# Patient Record
Sex: MALE | Race: WHITE | Employment: OTHER | URBAN - METROPOLITAN AREA
[De-identification: names, ages, dates, MRNs, and addresses within clinical notes are randomized per-mention and may not be internally consistent; named-entity substitution may affect disease eponyms.]

---

## 2017-09-11 ENCOUNTER — ALLSCRIPTS OFFICE VISIT (OUTPATIENT)
Dept: OTHER | Facility: OTHER | Age: 72
End: 2017-09-11

## 2017-09-11 LAB
BILIRUB UR QL STRIP: NORMAL
CLARITY UR: NORMAL
COLOR UR: YELLOW
GLUCOSE (HISTORICAL): NORMAL
HGB UR QL STRIP.AUTO: NORMAL
KETONES UR STRIP-MCNC: NORMAL MG/DL
LEUKOCYTE ESTERASE UR QL STRIP: NORMAL
NITRITE UR QL STRIP: NORMAL
PH UR STRIP.AUTO: 7.5 [PH]
PROT UR STRIP-MCNC: NORMAL MG/DL
SP GR UR STRIP.AUTO: 1.01
UROBILINOGEN UR QL STRIP.AUTO: 0.2

## 2017-11-20 ENCOUNTER — GENERIC CONVERSION - ENCOUNTER (OUTPATIENT)
Dept: OTHER | Facility: OTHER | Age: 72
End: 2017-11-20

## 2018-01-13 VITALS
WEIGHT: 180 LBS | DIASTOLIC BLOOD PRESSURE: 80 MMHG | SYSTOLIC BLOOD PRESSURE: 130 MMHG | BODY MASS INDEX: 26.66 KG/M2 | HEIGHT: 69 IN

## 2018-01-22 VITALS
RESPIRATION RATE: 18 BRPM | SYSTOLIC BLOOD PRESSURE: 124 MMHG | DIASTOLIC BLOOD PRESSURE: 72 MMHG | HEART RATE: 62 BPM | TEMPERATURE: 97.7 F | OXYGEN SATURATION: 97 % | HEIGHT: 70 IN | BODY MASS INDEX: 25.2 KG/M2 | WEIGHT: 176 LBS

## 2018-03-15 ENCOUNTER — OFFICE VISIT (OUTPATIENT)
Dept: UROLOGY | Facility: MEDICAL CENTER | Age: 73
End: 2018-03-15
Payer: MEDICARE

## 2018-03-15 VITALS — HEIGHT: 69 IN | SYSTOLIC BLOOD PRESSURE: 128 MMHG | DIASTOLIC BLOOD PRESSURE: 80 MMHG

## 2018-03-15 DIAGNOSIS — N52.03 COMBINED ARTERIAL INSUFFICIENCY AND CORPORO-VENOUS OCCLUSIVE ERECTILE DYSFUNCTION: ICD-10-CM

## 2018-03-15 DIAGNOSIS — N40.0 BPH WITHOUT URINARY OBSTRUCTION: Primary | ICD-10-CM

## 2018-03-15 LAB
SL AMB  POCT GLUCOSE, UA: ABNORMAL
SL AMB LEUKOCYTE ESTERASE,UA: ABNORMAL
SL AMB POCT BILIRUBIN,UA: ABNORMAL
SL AMB POCT BLOOD,UA: ABNORMAL
SL AMB POCT CLARITY,UA: CLEAR
SL AMB POCT COLOR,UA: YELLOW
SL AMB POCT KETONES,UA: ABNORMAL
SL AMB POCT NITRITE,UA: ABNORMAL
SL AMB POCT PH,UA: 6
SL AMB POCT SPECIFIC GRAVITY,UA: 1.01
SL AMB POCT URINE PROTEIN: ABNORMAL
SL AMB POCT UROBILINOGEN: 0.2

## 2018-03-15 PROCEDURE — 99215 OFFICE O/P EST HI 40 MIN: CPT | Performed by: UROLOGY

## 2018-03-15 PROCEDURE — 81003 URINALYSIS AUTO W/O SCOPE: CPT | Performed by: UROLOGY

## 2018-03-15 RX ORDER — AMLODIPINE BESYLATE 5 MG/1
1 TABLET ORAL DAILY
COMMUNITY
End: 2018-03-15

## 2018-03-15 RX ORDER — ATORVASTATIN CALCIUM 10 MG/1
10 TABLET, FILM COATED ORAL DAILY
Refills: 3 | COMMUNITY
Start: 2018-03-05

## 2018-03-15 RX ORDER — FLUOXETINE HYDROCHLORIDE 20 MG/1
20 CAPSULE ORAL EVERY MORNING
Refills: 3 | COMMUNITY
Start: 2018-02-09 | End: 2018-03-15

## 2018-03-15 RX ORDER — ZOLPIDEM TARTRATE 10 MG/1
5 TABLET ORAL DAILY
Refills: 5 | COMMUNITY
Start: 2018-02-05

## 2018-03-15 RX ORDER — ATORVASTATIN CALCIUM 10 MG/1
1 TABLET, FILM COATED ORAL DAILY
COMMUNITY
End: 2018-03-15 | Stop reason: SDUPTHER

## 2018-03-15 RX ORDER — LOSARTAN POTASSIUM 100 MG/1
100 TABLET ORAL DAILY
Refills: 3 | COMMUNITY
Start: 2018-02-22

## 2018-03-15 RX ORDER — SUMATRIPTAN 25 MG/1
TABLET, FILM COATED ORAL
Refills: 5 | COMMUNITY
Start: 2018-02-19

## 2018-03-15 RX ORDER — FLUOXETINE HYDROCHLORIDE 40 MG/1
1 CAPSULE ORAL DAILY
COMMUNITY
End: 2018-03-15 | Stop reason: SDUPTHER

## 2018-03-15 NOTE — PROGRESS NOTES
Assessment/Plan:  1  Erectile dysfunction secondary to arterial and veno-occlusive dysfunction  The patient has hypertension and is on a statin and more than likely the vessel disease is the cause of his ED  The patient no longer responds adequately to injection therapy is not interested in vacuum constriction devices  The patient is interested in pursuing an IPP and this was discussed in detail with the patient being made aware of the potential of bleeding infection deformity loss of size pain need for explantation or revision and the possibility of mechanical failure  The patient will consider penile implantation and return for scheduling if he wishes to do so  2   BPH without urinary obstructive symptoms  The patient is voiding adequately and has no complaints  No problem-specific Assessment & Plan notes found for this encounter  Diagnoses and all orders for this visit:    BPH without urinary obstruction  -     POCT urine dip auto non-scope    Combined arterial insufficiency and corporo-venous occlusive erectile dysfunction    Other orders  -     Discontinue: amLODIPine (NORVASC) 5 mg tablet; Take 1 tablet by mouth daily  -     atorvastatin (LIPITOR) 10 mg tablet; Take 10 mg by mouth daily  -     Discontinue: FLUoxetine (PROzac) 20 mg capsule; Take 20 mg by mouth every morning  -     Discontinue: atorvastatin (LIPITOR) 10 mg tablet; Take 1 tablet by mouth daily  -     losartan (COZAAR) 100 MG tablet; Take 100 mg by mouth daily  -     Discontinue: FLUoxetine (PROZAC) 40 MG capsule; Take 1 capsule by mouth daily  -     SUMAtriptan (IMITREX) 25 mg tablet; TAKE 1 TABLET BY MOUTH AT ONSET OF MIGRAINE  MAY REPEAT ONE TIME WITHIN 24 HOURS  -     zolpidem (AMBIEN) 10 mg tablet; Take 10 mg by mouth daily          Subjective:      Patient ID: Woody Rainey is a 81 y o  male      HPI 70-year-old male with significant erectile dysfunction who no longer responds the PDE 5 inhibitors and now notes that quad Mix no longer produces an adequately rigid erection  He also suffers from early de tumescence  He presents to discuss further potential therapies  The following portions of the patient's history were reviewed and updated as appropriate: allergies, current medications, past family history, past medical history, past social history, past surgical history and problem list     Review of Systems   Constitutional: Negative  HENT:        Recent loss of hearing on a sensory neuro basis in the left ear   Eyes: Negative  Respiratory: Negative  Cardiovascular: Negative  Gastrointestinal: Negative  Endocrine: Negative  Genitourinary: Negative  Musculoskeletal: Negative  Neurological: Negative  Psychiatric/Behavioral: Negative  Objective:      /80   Ht 5' 9" (1 753 m)          Physical Exam   Constitutional: He is oriented to person, place, and time  He appears well-developed and well-nourished  No distress  HENT:   Head: Atraumatic  Eyes: Conjunctivae and EOM are normal    Pulmonary/Chest: Breath sounds normal  No respiratory distress  Abdominal: Soft  He exhibits no distension  Genitourinary:   Genitourinary Comments: Penis normal circumcised without cutaneous lesions  Meatus patent and normally placed  Scrotum without cutaneous lesions  Testes and adnexa completely normal without palpable masses or tenderness  Neurological: He is alert and oriented to person, place, and time  Psychiatric: He has a normal mood and affect  His behavior is normal  Judgment and thought content normal    Nursing note and vitals reviewed

## 2018-03-15 NOTE — PROGRESS NOTES
IPSS Questionnaire (AUA-7): Over the past month    1)  How often have you had a sensation of not emptying your bladder completely after you finish urinating? 1 - Less than 1 time in 5   2)  How often have you had to urinate again less than two hours after you finished urinating? 0 - Not at all   3)  How often have you found you stopped and started again several times when you urinated? 2 - Less than half the time   4) How difficult have you found it to postpone urination? 0 - Not at all   5) How often have you had a weak urinary stream?  1 - Less than 1 time in 5   6) How often have you had to push or strain to begin urination? 0 - Not at all   7) How many times did you most typically get up to urinate from the time you went to bed until the time you got up in the morning?   1 - 1 time   Total Score:  5

## 2018-03-15 NOTE — LETTER
March 15, 2018     John Bui MD  [address]    Patient: Madelaine Acevedo   YOB: 1945   Date of Visit: 3/15/2018       Dear Dr Jon: Thank you for referring Orville Hdz to me for evaluation  Below are my notes for this consultation  If you have questions, please do not hesitate to call me  I look forward to following your patient along with you  Sincerely,        Krystin Maldonado MD        CC: No Recipients  Krystin Maldonado MD  3/15/2018 11:34 AM  Sign at close encounter  Assessment/Plan:  1  Erectile dysfunction secondary to arterial and veno-occlusive dysfunction  The patient has hypertension and is on a statin and more than likely the vessel disease is the cause of his ED  The patient no longer responds adequately to injection therapy is not interested in vacuum constriction devices  The patient is interested in pursuing an IPP and this was discussed in detail with the patient being made aware of the potential of bleeding infection deformity loss of size pain need for explantation or revision and the possibility of mechanical failure  The patient will consider penile implantation and return for scheduling if he wishes to do so  2   BPH without urinary obstructive symptoms  The patient is voiding adequately and has no complaints  No problem-specific Assessment & Plan notes found for this encounter  Diagnoses and all orders for this visit:    BPH without urinary obstruction  -     POCT urine dip auto non-scope    Combined arterial insufficiency and corporo-venous occlusive erectile dysfunction    Other orders  -     Discontinue: amLODIPine (NORVASC) 5 mg tablet; Take 1 tablet by mouth daily  -     atorvastatin (LIPITOR) 10 mg tablet; Take 10 mg by mouth daily  -     Discontinue: FLUoxetine (PROzac) 20 mg capsule; Take 20 mg by mouth every morning  -     Discontinue: atorvastatin (LIPITOR) 10 mg tablet;  Take 1 tablet by mouth daily  -     losartan (COZAAR) 100 MG tablet; Take 100 mg by mouth daily  -     Discontinue: FLUoxetine (PROZAC) 40 MG capsule; Take 1 capsule by mouth daily  -     SUMAtriptan (IMITREX) 25 mg tablet; TAKE 1 TABLET BY MOUTH AT ONSET OF MIGRAINE  MAY REPEAT ONE TIME WITHIN 24 HOURS  -     zolpidem (AMBIEN) 10 mg tablet; Take 10 mg by mouth daily          Subjective:      Patient ID: Drew Francis is a 81 y o  male  HPI 74-year-old male with significant erectile dysfunction who no longer responds the PDE 5 inhibitors and now notes that quad Mix no longer produces an adequately rigid erection  He also suffers from early de tumescence  He presents to discuss further potential therapies  The following portions of the patient's history were reviewed and updated as appropriate: allergies, current medications, past family history, past medical history, past social history, past surgical history and problem list     Review of Systems   Constitutional: Negative  HENT:        Recent loss of hearing on a sensory neuro basis in the left ear   Eyes: Negative  Respiratory: Negative  Cardiovascular: Negative  Gastrointestinal: Negative  Endocrine: Negative  Genitourinary: Negative  Musculoskeletal: Negative  Neurological: Negative  Psychiatric/Behavioral: Negative  Objective:      /80   Ht 5' 9" (1 753 m)          Physical Exam   Constitutional: He is oriented to person, place, and time  He appears well-developed and well-nourished  No distress  HENT:   Head: Atraumatic  Eyes: Conjunctivae and EOM are normal    Pulmonary/Chest: Breath sounds normal  No respiratory distress  Abdominal: Soft  He exhibits no distension  Genitourinary:   Genitourinary Comments: Penis normal circumcised without cutaneous lesions  Meatus patent and normally placed  Scrotum without cutaneous lesions  Testes and adnexa completely normal without palpable masses or tenderness  Neurological: He is alert and oriented to person, place, and time  Psychiatric: He has a normal mood and affect  His behavior is normal  Judgment and thought content normal    Nursing note and vitals reviewed

## 2018-09-11 DIAGNOSIS — N40.0 ENLARGED PROSTATE WITHOUT LOWER URINARY TRACT SYMPTOMS (LUTS): ICD-10-CM

## 2018-11-19 NOTE — PROGRESS NOTES
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 with Dr Jarad Comer who is currently ROSA, on yearly f/u  He sees his dermatologist yearly as well  He has not had a CXR this interval and does not want to have one  He was also due for lab work, which he did not want done either  He has no evidence of recurrent disease  He will continue to follow up with us once a year  Subjective:   CC: follow up regarding lentigo melanoma      HPI:    Mr  Irineo Matthews is a 70-year-old white male, who returns to the office today after a lapse of about 5 years  He was originally seen here at our office, January 27, 2011  At that time, he had come to us after a diagnosis of Lente go melanoma in situ of the left maxillary cheek  He is status post microscopic surgery 12, 2010 by [facility] [place]  He was followed by Dr Marry Munoz  for multiple dysplastic nevi  At that time  He does have a strong family history of melanoma as his brothers did die from this disease  He comes back today for a checkup  Interval History:    Overall he is doing well  He has a good energy level with an ECOG performance status of zero  He has a good appetite and his weight has been stable  He does have tinnitus in his one ear since a cold he had a year ago  He otherwise denies fevers ,chills, CP, SOB, N/V, diarrhea, all other ROS are unremarkable  PFSh was reviewed  Cancer Staging:  lentigo melanoma in situ of cheek    BRAF: not tested    Previous Hematologic/ Oncologic History:    Excision left maxillary cheek, Mohs procedure     Current Hematologic/ Oncologic Treatment:    observation          Test Results:    Imaging: No results found      Labs: No results found for: WBC, HGB, HCT, MCV, PLT  No results found for: NA, K, CL, CO2, ANIONGAP, BUN, CREATININE, GLUCOSE, GLUF, CALCIUM, CORRECTEDCA, AST, ALT, ALKPHOS, PROT, BILITOT, EGFR      No results found for: SPEP, UPEP    No results found for: PSA    No results found for: CEA    No results found for:     No results found for: AFP    No results found for: IRON, TIBC, FERRITIN    No results found for: [id number]      ROS: Review of Systems   Constitutional: Negative  HENT: Positive for tinnitus  Eyes: Negative  Respiratory: Negative  Cardiovascular: Negative  Gastrointestinal: Negative  Endocrine: Negative  Genitourinary: Negative  Musculoskeletal: Negative  Skin: Negative  Allergic/Immunologic: Negative  Neurological: Negative  Hematological: Negative  Psychiatric/Behavioral: Negative  Current Medications: Reviewed  Allergies: Reviewed  PMH/FH/SH:  Reviewed      Physical Exam:    There is no height or weight on file to calculate BSA  Wt Readings from Last 3 Encounters:   11/20/17 79 8 kg (176 lb)   09/11/17 81 6 kg (180 lb)   11/10/16 84 8 kg (187 lb)        Temp Readings from Last 3 Encounters:   11/20/17 97 7 °F (36 5 °C)   11/10/16 97 8 °F (36 6 °C)   11/10/15 (!) 96 8 °F (36 °C)        BP Readings from Last 3 Encounters:   03/15/18 128/80   11/20/17 124/72   09/11/17 130/80         Pulse Readings from Last 3 Encounters:   11/20/17 62   11/10/16 74   11/10/15 71     @LASTSAO2(3)@      Physical Exam   Constitutional: He is oriented to person, place, and time  He appears well-developed and well-nourished  HENT:   Head: Normocephalic and atraumatic  Mouth/Throat: Oropharynx is clear and moist    Eyes: Pupils are equal, round, and reactive to light  Conjunctivae and EOM are normal    Neck: Normal range of motion  Neck supple  No thyromegaly present  Cardiovascular: Normal rate, regular rhythm and normal heart sounds  Pulmonary/Chest: Effort normal and breath sounds normal    Abdominal: Soft  Bowel sounds are normal  He exhibits no distension and no mass   There is no tenderness  Musculoskeletal: Normal range of motion  He exhibits no edema  Lymphadenopathy:     He has no cervical adenopathy  Neurological: He is alert and oriented to person, place, and time  He has normal reflexes  Skin: Skin is warm and dry  No erythema  Psychiatric: He has a normal mood and affect  Vitals reviewed  Goals and Barriers:  Current Goal: Curative intent  Barriers: None  Patient's Capacity to Self Care:  Patient fully able to self care      Code Status: [unfilled]  Advance Directive and Living Will:      Power of :

## 2018-11-20 ENCOUNTER — OFFICE VISIT (OUTPATIENT)
Dept: HEMATOLOGY ONCOLOGY | Facility: CLINIC | Age: 73
End: 2018-11-20
Payer: MEDICARE

## 2018-11-20 VITALS
SYSTOLIC BLOOD PRESSURE: 118 MMHG | HEART RATE: 73 BPM | RESPIRATION RATE: 14 BRPM | BODY MASS INDEX: 27.85 KG/M2 | HEIGHT: 69 IN | WEIGHT: 188 LBS | DIASTOLIC BLOOD PRESSURE: 72 MMHG | TEMPERATURE: 98.5 F

## 2018-11-20 DIAGNOSIS — N40.0 ENLARGED PROSTATE WITHOUT LOWER URINARY TRACT SYMPTOMS (LUTS): ICD-10-CM

## 2018-11-20 DIAGNOSIS — C43.30 MALIGNANT MELANOMA OF FACE EXCLUDING EYELID, NOSE, LIP, AND EAR (HCC): Primary | ICD-10-CM

## 2018-11-20 DIAGNOSIS — Z00.00 ENCOUNTER FOR GENERAL ADULT MEDICAL EXAMINATION WITHOUT ABNORMAL FINDINGS: ICD-10-CM

## 2018-11-20 DIAGNOSIS — C43.9 MALIGNANT MELANOMA OF SKIN (HCC): ICD-10-CM

## 2018-11-20 PROCEDURE — 99213 OFFICE O/P EST LOW 20 MIN: CPT | Performed by: SPECIALIST

## 2018-11-20 NOTE — LETTER
November 20, 2018     Kat Curry MD  [address] [place] [address]    Patient: Finn Rees   YOB: 1945   Date of Visit: 11/20/2018       Dear Dr Colt Pittman: Thank you for referring Renita Jatinder to me for evaluation  Below are my notes for this consultation  If you have questions, please do not hesitate to call me  I look forward to following your patient along with you  Sincerely,        Levonia Landau, MD        CC: No Recipients  Levonia Landau, MD  11/20/2018  1:38 PM  Sign at close encounter  Hematology/Oncology Outpatient Follow- up Note  Finn Rees 72 y o  male MRN: @ Encounter: [id number]        Date:  11/19/2018        Assessment / Plan:    1  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 with Dr Alfredo Urena who is currently ROSA, on yearly f/u  He sees his dermatologist yearly as well  He has not had a CXR this interval and does not want to have one  He was also due for lab work, which he did not want done either  He has no evidence of recurrent disease  He will continue to follow up with us once a year  Subjective:   CC: follow up regarding lentigo melanoma      HPI:    Mr Carlitos Ramsey is a 70-year-old white male, who returns to the office today after a lapse of about 5 years  He was originally seen here at our office, January 27, 2011  At that time, he had come to us after a diagnosis of Lente go melanoma in situ of the left maxillary cheek  He is status post microscopic surgery 12, 2010 by [facility] [place]  He was followed by Dr Sherin Keys  for multiple dysplastic nevi  At that time  He does have a strong family history of melanoma as his brothers did die from this disease  He comes back today for a checkup  Interval History:    Overall he is doing well  He has a good energy level with an ECOG performance status of zero  He has a good appetite and his weight has been stable   He does have tinnitus in his one ear since a cold he had a year ago  He otherwise denies fevers ,chills, CP, SOB, N/V, diarrhea, all other ROS are unremarkable  PFSh was reviewed  Cancer Staging:  lentigo melanoma in situ of cheek    BRAF: not tested    Previous Hematologic/ Oncologic History:    Excision left maxillary cheek, Mohs procedure     Current Hematologic/ Oncologic Treatment:    observation          Test Results:    Imaging: No results found  Labs: No results found for: WBC, HGB, HCT, MCV, PLT  No results found for: NA, K, CL, CO2, ANIONGAP, BUN, CREATININE, GLUCOSE, GLUF, CALCIUM, CORRECTEDCA, AST, ALT, ALKPHOS, PROT, BILITOT, EGFR      No results found for: SPEP, UPEP    No results found for: PSA    No results found for: CEA    No results found for:     No results found for: AFP    No results found for: IRON, TIBC, FERRITIN    No results found for: [id number]      ROS: Review of Systems   Constitutional: Negative  HENT: Positive for tinnitus  Eyes: Negative  Respiratory: Negative  Cardiovascular: Negative  Gastrointestinal: Negative  Endocrine: Negative  Genitourinary: Negative  Musculoskeletal: Negative  Skin: Negative  Allergic/Immunologic: Negative  Neurological: Negative  Hematological: Negative  Psychiatric/Behavioral: Negative  Current Medications: Reviewed  Allergies: Reviewed  PMH/FH/SH:  Reviewed      Physical Exam:    There is no height or weight on file to calculate BSA      Wt Readings from Last 3 Encounters:   11/20/17 79 8 kg (176 lb)   09/11/17 81 6 kg (180 lb)   11/10/16 84 8 kg (187 lb)        Temp Readings from Last 3 Encounters:   11/20/17 97 7 °F (36 5 °C)   11/10/16 97 8 °F (36 6 °C)   11/10/15 (!) 96 8 °F (36 °C)        BP Readings from Last 3 Encounters:   03/15/18 128/80   11/20/17 124/72   09/11/17 130/80         Pulse Readings from Last 3 Encounters:   11/20/17 62   11/10/16 74   11/10/15 71     @LASTSAO2(3)@      Physical Exam   Constitutional: He is oriented to person, place, and time  He appears well-developed and well-nourished  HENT:   Head: Normocephalic and atraumatic  Mouth/Throat: Oropharynx is clear and moist    Eyes: Pupils are equal, round, and reactive to light  Conjunctivae and EOM are normal    Neck: Normal range of motion  Neck supple  No thyromegaly present  Cardiovascular: Normal rate, regular rhythm and normal heart sounds  Pulmonary/Chest: Effort normal and breath sounds normal    Abdominal: Soft  Bowel sounds are normal  He exhibits no distension and no mass  There is no tenderness  Musculoskeletal: Normal range of motion  He exhibits no edema  Lymphadenopathy:     He has no cervical adenopathy  Neurological: He is alert and oriented to person, place, and time  He has normal reflexes  Skin: Skin is warm and dry  No erythema  Psychiatric: He has a normal mood and affect  Vitals reviewed  Goals and Barriers:  Current Goal: Curative intent  Barriers: None  Patient's Capacity to Self Care:  Patient fully able to self care      Code Status: [unfilled]  Advance Directive and Living Will:      Power of :

## 2019-09-26 ENCOUNTER — TELEPHONE (OUTPATIENT)
Dept: UROLOGY | Facility: MEDICAL CENTER | Age: 74
End: 2019-09-26

## 2019-09-26 NOTE — TELEPHONE ENCOUNTER
Tell patient we need a copy of the report and he should bring a CD disc of the images with him to his next office visit

## 2019-09-26 NOTE — TELEPHONE ENCOUNTER
FYI    Patient of Dr Jessi Gandhi seen in the [facility] office  Patient called to advised that he had a MRI and they found a renal lesion in the left kidney  Patient just wanted to make Dr Jessi Gandhi and Dr Belem Basilio know  Please advise

## 2021-06-10 ENCOUNTER — TELEPHONE (OUTPATIENT)
Dept: HEMATOLOGY ONCOLOGY | Facility: CLINIC | Age: 76
End: 2021-06-10

## 2021-06-10 NOTE — TELEPHONE ENCOUNTER
Appointment Confirmation     Appointment with  [address]   Appointment date & time  6-17-21 @ 2:20pm   Location [facility]    Patient verbilized Understanding

## 2021-06-17 ENCOUNTER — OFFICE VISIT (OUTPATIENT)
Dept: HEMATOLOGY ONCOLOGY | Facility: CLINIC | Age: 76
End: 2021-06-17
Payer: MEDICARE

## 2021-06-17 VITALS
HEIGHT: 69 IN | TEMPERATURE: 98.4 F | BODY MASS INDEX: 26.81 KG/M2 | HEART RATE: 69 BPM | OXYGEN SATURATION: 98 % | SYSTOLIC BLOOD PRESSURE: 122 MMHG | RESPIRATION RATE: 16 BRPM | DIASTOLIC BLOOD PRESSURE: 72 MMHG | WEIGHT: 181 LBS

## 2021-06-17 DIAGNOSIS — C83.00 LYMPHOMA, SMALL LYMPHOCYTIC (HCC): Primary | ICD-10-CM

## 2021-06-17 DIAGNOSIS — Z80.9 FAMILY HISTORY OF CANCER: ICD-10-CM

## 2021-06-17 PROCEDURE — 99214 OFFICE O/P EST MOD 30 MIN: CPT | Performed by: INTERNAL MEDICINE

## 2021-06-17 RX ORDER — AMLODIPINE BESYLATE 5 MG/1
5 TABLET ORAL
COMMUNITY

## 2021-06-17 RX ORDER — METOPROLOL SUCCINATE 25 MG/1
25 TABLET, EXTENDED RELEASE ORAL DAILY
COMMUNITY
Start: 2021-05-17

## 2021-06-17 RX ORDER — IRBESARTAN 150 MG/1
TABLET ORAL
COMMUNITY

## 2021-06-17 RX ORDER — FLUOXETINE HYDROCHLORIDE 40 MG/1
CAPSULE ORAL
COMMUNITY

## 2021-06-17 NOTE — LETTER
June 23, 2021     Silvia Ricardo MD  [address]    Patient: Leif Pool   YOB: 1945   Date of Visit: 6/17/2021       Dear Dr Sherryle Borne: Thank you for referring Alma Li to me for evaluation  Below are my notes for this consultation  If you have questions, please do not hesitate to call me  I look forward to following your patient along with you  Sincerely,        Jaja Parker MD        CC: MD Jaja Pompa MD  6/23/2021  5:06 AM  Sign when Signing Visit  [address] [place] [address] 3  [address] [place] [address] Yasmani Goldsmith 1159  [phone]  [phone]     Date of Visit: 6/17/2021  Name: Leif Pool   YOB: 1945     Subjective    Subjective    VISIT DIAGNOSIS:  Diagnoses and all orders for this visit:    Lymphoma, small lymphocytic ([facility] [address] )    Family history of cancer    Other orders  -     FLUoxetine (PROzac) 40 MG capsule; Prozac  -     amLODIPine (NORVASC) 5 mg tablet; 5 mg  -     irbesartan (AVAPRO) 150 mg tablet; irbesartan 150 mg tablet  -     metoprolol succinate (TOPROL-XL) 25 mg 24 hr tablet;  Take 25 mg by mouth daily        Oncology History   Malignant melanoma of face excluding eyelid, nose, lip, and ear ([facility] [address] )   8/12/2010 -  Cancer Staged    Staging form: Melanoma of the Skin, AJCC 6th Edition  - Clinical stage from 8/12/2010: Stage 0 (Tis, N0, M0) - Signed by Jaja Parker MD on 6/23/2021 8/12/2010 Surgery    Moh's surgery with Dr Felisha Aguirre at [facility]     11/20/2018 Initial Diagnosis    Malignant melanoma of face excluding eyelid, nose, lip, and ear ([address] )        Cancer Staging  Malignant melanoma of face excluding eyelid, nose, lip, and ear ([address] )  Staging form: Melanoma of the Skin, AJCC 6th Edition  - Clinical stage from 8/12/2010: Stage 0 (Tis, N0, M0) - Signed by Jaja Parker MD on 6/23/2021     [No treatment plan]     HISTORY OF PRESENT ILLNESS: Satnam Abbasi is a 78 y o  male  who has history of MIS (Stage 0) melanoma diagnosed in August 2010  Biopsy demonstrate lenttigo maligna melanoma Stage 0 (MIS)>  He underwent Moh's surgery with Cathy Gil  He had remained ROSA since  He has had intermittent follow up with medical oncology, Dr Edu Fiore in 11/2018, and dermatology, last approximately 1 1/2 years ago  He is doing well and feels good  He denies any new, changing, or concerning lesions  REVIEW OF SYSTEMS:  Review of Systems   Constitutional: Negative for appetite change, fatigue, fever and unexpected weight change  HENT:   Negative for lump/mass  Eyes: Negative for icterus  Respiratory: Negative for cough, shortness of breath and wheezing  Cardiovascular: Negative for leg swelling  Gastrointestinal: Negative for abdominal pain, constipation, diarrhea, nausea and vomiting  Genitourinary: Negative for difficulty urinating and hematuria  Musculoskeletal: Negative for arthralgias, gait problem and myalgias  Skin: Negative for itching and rash  No new, changing, or concerning lesions  Neurological: Negative for extremity weakness, gait problem, headaches, light-headedness and numbness  Hematological: Negative for adenopathy          MEDICATIONS:    Current Outpatient Medications:     amLODIPine (NORVASC) 5 mg tablet, 5 mg, Disp: , Rfl:     atorvastatin (LIPITOR) 10 mg tablet, Take 10 mg by mouth daily, Disp: , Rfl: 3    FLUoxetine (PROzac) 40 MG capsule, Prozac, Disp: , Rfl:     irbesartan (AVAPRO) 150 mg tablet, irbesartan 150 mg tablet, Disp: , Rfl:     losartan (COZAAR) 100 MG tablet, Take 100 mg by mouth daily, Disp: , Rfl: 3    metoprolol succinate (TOPROL-XL) 25 mg 24 hr tablet, Take 25 mg by mouth daily, Disp: , Rfl:     SUMAtriptan (IMITREX) 25 mg tablet, TAKE 1 TABLET BY MOUTH AT ONSET OF MIGRAINE  MAY REPEAT ONE TIME WITHIN 24 HOURS, Disp: , Rfl: 5    zolpidem (AMBIEN) 10 mg tablet, Take 5 mg by mouth daily , Disp: , Rfl: 5     ALLERGIES:  Allergies   Allergen Reactions    Percocet  [Oxycodone-Acetaminophen] Rash        ACTIVE PROBLEMS:  Patient Active Problem List   Diagnosis    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    Malignant melanoma of face excluding eyelid, nose, lip, and ear [facility]    Essential hypertension    Lymphoma, small lymphocytic ([facility]    Family history of cancer          PAST MEDICAL HISTORY:   Past Medical History:   Diagnosis Date    BPH with obstruction/lower urinary tract symptoms 2016    [facility]LL [facility]ocytic [facility]) [facility]     Corporo-venous occlusive erectile dysfunction 2016    Cyst, kidney, acquired 2016    Hearing loss     asymmetric left sided    Hyperlipidemia     Hypertension     Skin cancer     melanoma        PAST SURGICAL HISTORY:  Past Surgical History:   Procedure Laterality Date    BACK SURGERY  2016    EAR SURGERY Left     transtympanic steroid injections x 3 - 2017    SKIN CANCER EXCISION      facial melanoma - [facility]        SOCIAL HISTORY:  Social History     Socioeconomic History    Marital status: /Civil Union     Spouse name: None    Number of children: None    Years of education: None    Highest education level: None   Occupational History    None   Tobacco Use    Smoking status: Former Smoker     Packs/day: 1 00     Types: Cigarettes     Start date: 1967     Quit date:      Years since quittin 4    Smokeless tobacco: Never Used   Vaping Use    Vaping Use: Never used   Substance and Sexual Activity    Alcohol use:  Yes     Alcohol/week: 14 0 standard drinks     Types: 14 Glasses of wine per week    Drug use: Yes     Types: Marijuana    Sexual activity: None   Other Topics Concern    None   Social History Narrative    Consumes 3-4 cups of coffee per day     Social Determinants of Health     Financial Resource Strain:     Difficulty of Paying Living Expenses:    Food Insecurity:     Worried About Running Out of Food in the Last Year:     [address] in the Last Year:    Transportation Needs:     Lack of Transportation (Medical):  Lack of Transportation (Non-Medical):    Physical Activity:     Days of Exercise per Week:     Minutes of Exercise per Session:    Stress:     Feeling of Stress :    Social Connections:     Frequency of Communication with Friends and Family:     Frequency of Social Gatherings with Friends and Family:     Attends Mandaen Services:     Active Member of Clubs or Organizations:     Attends Club or Organization Meetings:     Marital Status:    Intimate Partner Violence:     Fear of Current or Ex-Partner:     Emotionally Abused:     Physically Abused:     Sexually Abused:         FAMILY HISTORY:  Family History   Problem Relation Age of Onset    Pancreatic cancer Father     Skin cancer Sister     Breast cancer Sister     Melanoma Brother         Objective    Objective    PHYSICAL EXAMINATION:   Blood pressure 122/72, pulse 69, temperature 98 4 °F (36 9 °C), temperature source Temporal, resp  rate 16, height 5' 9" (1 753 m), weight 82 1 kg (181 lb), SpO2 98 %  ECOG Performance Status      Most Recent Value   ECOG Performance Status  0 - Fully active, able to carry on all pre-disease performance without restriction            Physical Exam  Constitutional:       General: He is not in acute distress  Appearance: Normal appearance  He is not toxic-appearing  HENT:      Mouth/Throat:      Mouth: Mucous membranes are moist       Pharynx: Oropharynx is clear  Eyes:      General: No scleral icterus  Cardiovascular:      Rate and Rhythm: Normal rate and regular rhythm  Pulses: Normal pulses  Heart sounds: No murmur heard  No friction rub  No gallop  Pulmonary:      Effort: Pulmonary effort is normal  No respiratory distress  Breath sounds: Normal breath sounds  No wheezing or rales     Abdominal: General: There is no distension  Palpations: There is no mass  Tenderness: There is no abdominal tenderness  There is no rebound  Musculoskeletal:         General: No swelling or tenderness  Right lower leg: No edema  Left lower leg: No edema  Lymphadenopathy:      Head:      Right side of head: No submandibular, preauricular or posterior auricular adenopathy  Left side of head: No submandibular, preauricular or posterior auricular adenopathy  Cervical: No cervical adenopathy  Right cervical: No superficial or posterior cervical adenopathy  Left cervical: No superficial or posterior cervical adenopathy  Upper Body:      Right upper body: No supraclavicular or axillary adenopathy  Left upper body: No supraclavicular or axillary adenopathy  Lower Body: No right inguinal adenopathy  No left inguinal adenopathy  Skin:     Findings: No rash  Comments: Well healed surgical scar  No evidence of recurrence at primary site  Neurological:      General: No focal deficit present  Mental Status: He is alert and oriented to person, place, and time  Psychiatric:         Mood and Affect: Mood normal          Behavior: Behavior normal          Thought Content: Thought content normal          Judgment: Judgment normal          I reviewed lab data in the chart  Labs in Care Everywhere reviewed  No results found for: WBC, HGB, PLT, MCV   No results found for: NA, K, CL, CO2, BUN, CREATININE, GLUC, EGFRNAA, EGFRAA, CALCIUM, PROT, ALB, TBILI, ALKPHOS, AST, ALT   No results found for: LDH  No results found for: TSH  No results found for: [id number]   No results found for: FREET4      RECENT IMAGING:  No results found  Assessment    Assessment/Plan    Mr Sugar Nolasco is a 78 yr male with MIS (Stage 0) melanoma here for follow up and surveillance      Malignant melanoma of face excluding eyelid, nose, lip, and ear [facility])  Mr Sugar Nolasco is a 63-year-old man with melanoma in-situ diagnosed in August 2010  Since then, he has been under surveillance intermittently with medical oncology and dermatology  Current Mckenzie, he has not seen Dermatology in approximately over a year, and I recommended that he continue to see Dermatology with close follow-up  He had been a patient of Dr Elena Soto and last saw him in November 2018  He is currently doing well and denies any new, changing, or concerning lesions  We had asked him to get labs following today's visit, but he states that he had labs recently performed by his primary care doctor,  and did not wish to repeat these labs  We do note that his labs do not contain an LDH  We discussed safe sun practices including hat, sun protective clothing, sunscreen use, and avoiding sun during peak hours  We discussed continue with yearly follow-up with me, along with his dermatologist   He will come back in 1 year for return visit and surveillance  We discussed he will need labs prior to his next visit we provided him with scripts for CBC, comp, and LDH  He knows to call if there is any issues or concerns prior to his next visit  Lymphoma, small lymphocytic ([facility] [address] )  He is followed by Dr Melissa Tenorio at [facility]  He states that he no longer has this condition and is doing well  He has never had treatment for his CLL/SLL  He will continue follow-up with Dr Melissa Tenorio as planned  Family history of cancer  He has a family history of melanoma in his brother, breast cancer in his sister diagnosed around the age 54, and pancreatic cancer in his father  He has a family history of cancer in his family  Given this history and his melanoma, I am concerned that there could be a genetic underpinning to his  disease and recommend that he  speak with a genetic counselor regarding germline genetic testing  He did not wish to speak with a genetic counselor at this time    We discussed that he can always decide to see one at a later date       He will return in one year, but knows to call if there are issues or concerns prior to his next visit      Suzy Myers MD, PhD

## 2021-06-18 PROBLEM — C83.00 LYMPHOMA, SMALL LYMPHOCYTIC (HCC): Status: ACTIVE | Noted: 2019-11-13

## 2021-06-18 PROBLEM — I10 ESSENTIAL HYPERTENSION: Status: ACTIVE | Noted: 2019-11-13

## 2021-06-23 PROBLEM — Z80.9 FAMILY HISTORY OF CANCER: Status: ACTIVE | Noted: 2021-06-23

## 2021-06-23 NOTE — ASSESSMENT & PLAN NOTE
He has a family history of melanoma in his brother, breast cancer in his sister diagnosed around the age 54, and pancreatic cancer in his father  He has a family history of cancer in his family  Given this history and his melanoma, I am concerned that there could be a genetic underpinning to his  disease and recommend that he  speak with a genetic counselor regarding germline genetic testing  He did not wish to speak with a genetic counselor at this time  We discussed that he can always decide to see one at a later date

## 2021-06-23 NOTE — PROGRESS NOTES
[facility]  [address] [facility] [id number] [phone]     Date of Visit: 6/17/2021  Name: Violette Nguyen   YOB: 1945     Subjective    Subjective    VISIT DIAGNOSIS:  Diagnoses and all orders for this visit:    Lymphoma, small lymphocytic ([facility] [address] )    Family history of cancer    Other orders  -     FLUoxetine (PROzac) 40 MG capsule; Prozac  -     amLODIPine (NORVASC) 5 mg tablet; 5 mg  -     irbesartan (AVAPRO) 150 mg tablet; irbesartan 150 mg tablet  -     metoprolol succinate (TOPROL-XL) 25 mg 24 hr tablet; Take 25 mg by mouth daily        Oncology History   Malignant melanoma of face excluding eyelid, nose, lip, and ear ([facility] [address] )   8/12/2010 -  Cancer Staged    Staging form: Melanoma of the Skin, AJCC 6th Edition  - Clinical stage from 8/12/2010: Stage 0 (Tis, N0, M0) - Signed by Cynthia Snider MD on 6/23/2021 8/12/2010 Surgery    Moh's surgery with Dr Shannen Faust at [facility]     11/20/2018 Initial Diagnosis    Malignant melanoma of face excluding eyelid, nose, lip, and ear ([facility] [address] )        Cancer Staging  Malignant melanoma of face excluding eyelid, nose, lip, and ear ([facility] [address] )  Staging form: Melanoma of the Skin, AJCC 6th Edition  - Clinical stage from 8/12/2010: Stage 0 (Tis, N0, M0) - Signed by Cynthia Snider MD on 6/23/2021     [No treatment plan]     HISTORY OF PRESENT ILLNESS: Violette Nguyen is a 78 y o  male  who has history of MIS (Stage 0) melanoma diagnosed in August 2010  Biopsy demonstrate lenttigo maligna melanoma Stage 0 (MIS)>  He underwent Moh's surgery with Kirsty Schultz  He had remained ROSA since  He has had intermittent follow up with medical oncology, Dr Marianne Freeman in 11/2018, and dermatology, last approximately 1 1/2 years ago  He is doing well and feels good  He denies any new, changing, or concerning lesions        REVIEW OF SYSTEMS:  Review of Systems   Constitutional: Negative for appetite change, fatigue, fever and unexpected weight change  HENT:   Negative for lump/mass  Eyes: Negative for icterus  Respiratory: Negative for cough, shortness of breath and wheezing  Cardiovascular: Negative for leg swelling  Gastrointestinal: Negative for abdominal pain, constipation, diarrhea, nausea and vomiting  Genitourinary: Negative for difficulty urinating and hematuria  Musculoskeletal: Negative for arthralgias, gait problem and myalgias  Skin: Negative for itching and rash  No new, changing, or concerning lesions  Neurological: Negative for extremity weakness, gait problem, headaches, light-headedness and numbness  Hematological: Negative for adenopathy          MEDICATIONS:    Current Outpatient Medications:     amLODIPine (NORVASC) 5 mg tablet, 5 mg, Disp: , Rfl:     atorvastatin (LIPITOR) 10 mg tablet, Take 10 mg by mouth daily, Disp: , Rfl: 3    FLUoxetine (PROzac) 40 MG capsule, Prozac, Disp: , Rfl:     irbesartan (AVAPRO) 150 mg tablet, irbesartan 150 mg tablet, Disp: , Rfl:     losartan (COZAAR) 100 MG tablet, Take 100 mg by mouth daily, Disp: , Rfl: 3    metoprolol succinate (TOPROL-XL) 25 mg 24 hr tablet, Take 25 mg by mouth daily, Disp: , Rfl:     SUMAtriptan (IMITREX) 25 mg tablet, TAKE 1 TABLET BY MOUTH AT ONSET OF MIGRAINE  MAY REPEAT ONE TIME WITHIN 24 HOURS, Disp: , Rfl: 5    zolpidem (AMBIEN) 10 mg tablet, Take 5 mg by mouth daily , Disp: , Rfl: 5     ALLERGIES:  Allergies   Allergen Reactions    Percocet  [Oxycodone-Acetaminophen] Rash        ACTIVE PROBLEMS:  Patient Active Problem List   Diagnosis    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    Malignant melanoma of face excluding eyelid, nose, lip, and ear ([address] )    Essential hypertension    Lymphoma, small lymphocytic ([address] )    Family history of cancer          PAST MEDICAL HISTORY:   Past Medical History:   Diagnosis Date    BPH with obstruction/lower urinary tract symptoms 2016    CLL (chronic lymphocytic leukemia) ([facility] [address] )     Corporo-venous occlusive erectile dysfunction 2016    Cyst, kidney, acquired 2016    Hearing loss     asymmetric left sided    Hyperlipidemia     Hypertension     Skin cancer     melanoma        PAST SURGICAL HISTORY:  Past Surgical History:   Procedure Laterality Date    BACK SURGERY  2016    EAR SURGERY Left     transtympanic steroid injections x 3 - 2017    SKIN CANCER EXCISION      facial melanoma - [facility]        SOCIAL HISTORY:  Social History     Socioeconomic History    Marital status: /Civil Union     Spouse name: None    Number of children: None    Years of education: None    Highest education level: None   Occupational History    None   Tobacco Use    Smoking status: Former Smoker     Packs/day: 1 00     Types: Cigarettes     Start date: 1967     Quit date:      Years since quittin 4    Smokeless tobacco: Never Used   Vaping Use    Vaping Use: Never used   Substance and Sexual Activity    Alcohol use: Yes     Alcohol/week: 14 0 standard drinks     Types: 14 Glasses of wine per week    Drug use: Yes     Types: Marijuana    Sexual activity: None   Other Topics Concern    None   Social History Narrative    Consumes 3-4 cups of coffee per day     Social Determinants of Health     Financial Resource Strain:     Difficulty of Paying Living Expenses:    Food Insecurity:     Worried About Running Out of Food in the Last Year:     Ran Out of Food in the Last Year:    Transportation Needs:     Lack of Transportation (Medical):      Lack of Transportation (Non-Medical):    Physical Activity:     Days of Exercise per Week:     Minutes of Exercise per Session:    Stress:     Feeling of Stress :    Social Connections:     Frequency of Communication with Friends and Family:     Frequency of Social Gatherings with Friends and Family:     Attends Jewish Services:     Active Member of Clubs or Organizations:     Attends Club or Organization Meetings:     Marital Status:    Intimate Partner Violence:     Fear of Current or Ex-Partner:     Emotionally Abused:     Physically Abused:     Sexually Abused:         FAMILY HISTORY:  Family History   Problem Relation Age of Onset    Pancreatic cancer Father     Skin cancer Sister     Breast cancer Sister     Melanoma Brother         Objective    Objective    PHYSICAL EXAMINATION:   Blood pressure 122/72, pulse 69, temperature 98 4 °F (36 9 °C), temperature source Temporal, resp  rate 16, height 5' 9" (1 753 m), weight 82 1 kg (181 lb), SpO2 98 %  ECOG Performance Status      Most Recent Value   ECOG Performance Status  0 - Fully active, able to carry on all pre-disease performance without restriction            Physical Exam  Constitutional:       General: He is not in acute distress  Appearance: Normal appearance  He is not toxic-appearing  HENT:      Mouth/Throat:      Mouth: Mucous membranes are moist       Pharynx: Oropharynx is clear  Eyes:      General: No scleral icterus  Cardiovascular:      Rate and Rhythm: Normal rate and regular rhythm  Pulses: Normal pulses  Heart sounds: No murmur heard  No friction rub  No gallop  Pulmonary:      Effort: Pulmonary effort is normal  No respiratory distress  Breath sounds: Normal breath sounds  No wheezing or rales  Abdominal:      General: There is no distension  Palpations: There is no mass  Tenderness: There is no abdominal tenderness  There is no rebound  Musculoskeletal:         General: No swelling or tenderness  Right lower leg: No edema  Left lower leg: No edema  Lymphadenopathy:      Head:      Right side of head: No submandibular, preauricular or posterior auricular adenopathy  Left side of head: No submandibular, preauricular or posterior auricular adenopathy  Cervical: No cervical adenopathy        Right cervical: No superficial or posterior cervical adenopathy  Left cervical: No superficial or posterior cervical adenopathy  Upper Body:      Right upper body: No supraclavicular or axillary adenopathy  Left upper body: No supraclavicular or axillary adenopathy  Lower Body: No right inguinal adenopathy  No left inguinal adenopathy  Skin:     Findings: No rash  Comments: Well healed surgical scar  No evidence of recurrence at primary site  Neurological:      General: No focal deficit present  Mental Status: He is alert and oriented to person, place, and time  Psychiatric:         Mood and Affect: Mood normal          Behavior: Behavior normal          Thought Content: Thought content normal          Judgment: Judgment normal          I reviewed lab data in the chart  Labs in Care Everywhere reviewed  No results found for: WBC, HGB, PLT, MCV   No results found for: NA, K, CL, CO2, BUN, CREATININE, GLUC, EGFRNAA, EGFRAA, CALCIUM, PROT, ALB, TBILI, ALKPHOS, AST, ALT   No results found for: LDH  No results found for: TSH  No results found for: [id number]   No results found for: FREET4      RECENT IMAGING:  No results found  Assessment    Assessment/Plan    Mr Nolan Chiu is a 78 yr male with MIS (Stage 0) melanoma here for follow up and surveillance  Malignant melanoma of face excluding eyelid, nose, lip, and ear [facility])  Mr Nolan Chiu is a 72-year-old man with melanoma in-situ diagnosed in August 2010  Since then, he has been under surveillance intermittently with medical oncology and dermatology  Current Mckenzie, he has not seen Dermatology in approximately over a year, and I recommended that he continue to see Dermatology with close follow-up  He had been a patient of Dr Rock Melissa and last saw him in November 2018  He is currently doing well and denies any new, changing, or concerning lesions        We had asked him to get labs following today's visit, but he states that he had labs recently performed by his primary care doctor,  and did not wish to repeat these labs  We do note that his labs do not contain an LDH  We discussed safe sun practices including hat, sun protective clothing, sunscreen use, and avoiding sun during peak hours  We discussed continue with yearly follow-up with me, along with his dermatologist   He will come back in 1 year for return visit and surveillance  We discussed he will need labs prior to his next visit we provided him with scripts for CBC, comp, and LDH  He knows to call if there is any issues or concerns prior to his next visit  Lymphoma, small lymphocytic ([address] )  He is followed by Dr Kalia Barney at [facility]  He states that he no longer has this condition and is doing well  He has never had treatment for his CLL/SLL  He will continue follow-up with Dr Kalia Barney as planned  Family history of cancer  He has a family history of melanoma in his brother, breast cancer in his sister diagnosed around the age 54, and pancreatic cancer in his father  He has a family history of cancer in his family  Given this history and his melanoma, I am concerned that there could be a genetic underpinning to his  disease and recommend that he  speak with a genetic counselor regarding germline genetic testing  He did not wish to speak with a genetic counselor at this time  We discussed that he can always decide to see one at a later date  He will return in one year, but knows to call if there are issues or concerns prior to his next visit      Kiel Couch MD, PhD

## 2021-06-23 NOTE — ASSESSMENT & PLAN NOTE
He is followed by Dr Itz Freire at [facility]  He states that he no longer has this condition and is doing well  He has never had treatment for his CLL/SLL  He will continue follow-up with Dr Itz Freire as planned

## 2021-06-23 NOTE — ASSESSMENT & PLAN NOTE
Mr Parag Brumfield is a 70-year-old man with melanoma in-situ diagnosed in August 2010  Since then, he has been under surveillance intermittently with medical oncology and dermatology  Current Mckenzie, he has not seen Dermatology in approximately over a year, and I recommended that he continue to see Dermatology with close follow-up  He had been a patient of Dr Niraml Harvey and last saw him in November 2018  He is currently doing well and denies any new, changing, or concerning lesions  We had asked him to get labs following today's visit, but he states that he had labs recently performed by his primary care doctor,  and did not wish to repeat these labs  We do note that his labs do not contain an LDH  We discussed safe sun practices including hat, sun protective clothing, sunscreen use, and avoiding sun during peak hours  We discussed continue with yearly follow-up with me, along with his dermatologist   He will come back in 1 year for return visit and surveillance  We discussed he will need labs prior to his next visit we provided him with scripts for CBC, comp, and LDH  He knows to call if there is any issues or concerns prior to his next visit

## 2022-01-17 ENCOUNTER — TELEPHONE (OUTPATIENT)
Dept: UROLOGY | Facility: AMBULATORY SURGERY CENTER | Age: 77
End: 2022-01-17

## 2022-01-17 NOTE — TELEPHONE ENCOUNTER
Patient last seen in 2018 by Dr Chucky Bright is calling to request follow up with MD  Patient stated he is having urinary symptoms and abdominal pain  Scheduled with [facility] on 2/22/22  Wants to know if he needs any testing

## 2022-01-17 NOTE — TELEPHONE ENCOUNTER
Since it has been over 3 years he is considered a new patient    He should have his PCP order urine testing

## 2022-02-22 ENCOUNTER — OFFICE VISIT (OUTPATIENT)
Dept: UROLOGY | Facility: CLINIC | Age: 77
End: 2022-02-22
Payer: MEDICARE

## 2022-02-22 VITALS
WEIGHT: 175 LBS | BODY MASS INDEX: 25.92 KG/M2 | DIASTOLIC BLOOD PRESSURE: 70 MMHG | SYSTOLIC BLOOD PRESSURE: 150 MMHG | HEART RATE: 72 BPM | HEIGHT: 69 IN

## 2022-02-22 DIAGNOSIS — Z12.5 PROSTATE CANCER SCREENING: ICD-10-CM

## 2022-02-22 DIAGNOSIS — N40.1 BENIGN PROSTATIC HYPERPLASIA WITH LOWER URINARY TRACT SYMPTOMS, SYMPTOM DETAILS UNSPECIFIED: Primary | ICD-10-CM

## 2022-02-22 PROBLEM — N52.8 OTHER MALE ERECTILE DYSFUNCTION: Status: ACTIVE | Noted: 2022-02-22

## 2022-02-22 LAB
BACTERIA UR QL AUTO: ABNORMAL /HPF
BILIRUB UR QL STRIP: NEGATIVE
CAOX CRY URNS QL MICRO: ABNORMAL /HPF
CLARITY UR: CLEAR
COLOR UR: YELLOW
GLUCOSE UR STRIP-MCNC: NEGATIVE MG/DL
HGB UR QL STRIP.AUTO: NEGATIVE
KETONES UR STRIP-MCNC: NEGATIVE MG/DL
LEUKOCYTE ESTERASE UR QL STRIP: NEGATIVE
NITRITE UR QL STRIP: NEGATIVE
NON-SQ EPI CELLS URNS QL MICRO: ABNORMAL /HPF
PH UR STRIP.AUTO: 5.5 [PH]
PROT UR STRIP-MCNC: NEGATIVE MG/DL
RBC #/AREA URNS AUTO: ABNORMAL /HPF
SL AMB  POCT GLUCOSE, UA: NORMAL
SL AMB LEUKOCYTE ESTERASE,UA: NORMAL
SL AMB POCT BILIRUBIN,UA: NORMAL
SL AMB POCT BLOOD,UA: NORMAL
SL AMB POCT CLARITY,UA: CLEAR
SL AMB POCT COLOR,UA: YELLOW
SL AMB POCT KETONES,UA: NORMAL
SL AMB POCT NITRITE,UA: NORMAL
SL AMB POCT PH,UA: 5
SL AMB POCT SPECIFIC GRAVITY,UA: 1.02
SL AMB POCT URINE PROTEIN: NORMAL
SL AMB POCT UROBILINOGEN: 0.2
SP GR UR STRIP.AUTO: >=1.03 (ref 1–1.03)
UROBILINOGEN UR QL STRIP.AUTO: 0.2 E.U./DL
WBC #/AREA URNS AUTO: ABNORMAL /HPF

## 2022-02-22 PROCEDURE — 99202 OFFICE O/P NEW SF 15 MIN: CPT | Performed by: NURSE PRACTITIONER

## 2022-02-22 PROCEDURE — 81001 URINALYSIS AUTO W/SCOPE: CPT | Performed by: NURSE PRACTITIONER

## 2022-02-22 PROCEDURE — 81002 URINALYSIS NONAUTO W/O SCOPE: CPT | Performed by: NURSE PRACTITIONER

## 2022-02-22 NOTE — ASSESSMENT & PLAN NOTE
· Secondary to arterial and venous occlusive dysfunction  · Previously failed oral medication  · Failed injection therapy  · Previously discussed IPP with Dr Herminia Irizarry however is not interested in surgical management

## 2022-02-22 NOTE — PROGRESS NOTES
Assessment and plan:     Benign prostatic hyperplasia with lower urinary tract symptoms  · Send urine microscopic  · Continue to monitor off medication  · Follow-up 1 year    Prostate cancer screening  · PSA 0 80  · Follow-up 1 year routine prostate cancer screening    Other male erectile dysfunction  · Secondary to arterial and venous occlusive dysfunction  · Previously failed oral medication  · Failed injection therapy  · Previously discussed IPP with Dr Rebeka Bustos however is not interested in surgical management          REDDY Sterling    History of Present Illness     Marilyn Arndt is a 78 y o  new patient who presents for abdominal pain and erectile dysfunction  He was last evaluated by Dr Rebeka Bustos in 2018  HE reports that a month ago he had some suprapubic discomfort that has since resolved  He denies urinary symptoms of frequency and urgency  He has a slow weak stream this is not any worse since his last evaluation in 2018  He is not on any medication and is not interested in starting anything  He denies family history of prostate cancer  His last PSA was completed 12/02/2021 was 0 80  he has not had a rectal exam for some time  At that time he reported erectile dysfunction which is most likely vessel disease secondary to hypertension and statin use  He was no longer responding adequately to injection therapy after a year and declined use of vacuum assisted devices  He had a discussion about pursuing IPP with Dr Rebeka Bustos and he was considering proceeding with that procedure but has not followed up since  He is not interested in surgery  He is on prozac since 1988      Laboratory     No results found for: BUN, CREATININE    No components found for: GFR    No results found for: GLUCOSE, CALCIUM, NA, K, CO2, CL    No results found for: WBC, HGB, HCT, MCV, PLT    No results found for: PSA    No results found for this or any previous visit (from the past 1 hour(s))  @RESULT(URINEMICROSCOPIC)@    @RESULT(URINECULTURE)@    Radiology       Review of Systems     Review of Systems   Constitutional: Negative for activity change, appetite change, chills, fatigue, fever and unexpected weight change  HENT: Positive for hearing loss  Negative for facial swelling  Eyes: Negative for discharge  Respiratory: Negative  Negative for cough and shortness of breath  Cardiovascular: Negative for chest pain and leg swelling  Gastrointestinal: Negative  Negative for abdominal distention, abdominal pain, constipation, diarrhea, nausea and vomiting  Endocrine: Negative  Genitourinary: Negative  Negative for decreased urine volume, difficulty urinating, dysuria, enuresis, flank pain, frequency, genital sores, hematuria and urgency  Weak urinary stream,    Musculoskeletal: Negative for back pain and myalgias  Skin: Negative for pallor and rash  Allergic/Immunologic: Negative  Negative for immunocompromised state  Neurological: Negative for facial asymmetry and speech difficulty  Psychiatric/Behavioral: Negative for agitation and confusion  Allergies     Allergies   Allergen Reactions    Percocet  [Oxycodone-Acetaminophen] Rash       Physical Exam     Physical Exam  Vitals reviewed  Constitutional:       General: He is not in acute distress  Appearance: Normal appearance  He is normal weight  He is not ill-appearing, toxic-appearing or diaphoretic  HENT:      Head: Normocephalic and atraumatic  Eyes:      General: No scleral icterus  Cardiovascular:      Rate and Rhythm: Normal rate  Pulmonary:      Effort: Pulmonary effort is normal  No respiratory distress  Abdominal:      General: Abdomen is flat  There is no distension  Palpations: Abdomen is soft  Tenderness: There is no abdominal tenderness  There is no guarding or rebound     Genitourinary:     Comments: Prostate smooth nontender without appreciable nodules  Musculoskeletal:         General: No swelling  Cervical back: Normal range of motion  Right lower leg: No edema  Left lower leg: No edema  Skin:     General: Skin is warm and dry  Coloration: Skin is not jaundiced or pale  Findings: No rash  Neurological:      General: No focal deficit present  Mental Status: He is alert and oriented to person, place, and time  Gait: Gait normal    Psychiatric:         Mood and Affect: Mood normal          Behavior: Behavior normal          Thought Content:  Thought content normal          Judgment: Judgment normal          Vital Signs     Vitals:    02/22/22 1407   BP: 150/70   BP Location: Left arm   Patient Position: Sitting   Cuff Size: Adult   Pulse: 72   Weight: 79 4 kg (175 lb)   Height: 5' 9" (1 753 m)       Current Medications       Current Outpatient Medications:     amLODIPine (NORVASC) 5 mg tablet, 5 mg, Disp: , Rfl:     atorvastatin (LIPITOR) 10 mg tablet, Take 10 mg by mouth daily, Disp: , Rfl: 3    FLUoxetine (PROzac) 40 MG capsule, Prozac, Disp: , Rfl:     metoprolol succinate (TOPROL-XL) 25 mg 24 hr tablet, Take 25 mg by mouth daily, Disp: , Rfl:     SUMAtriptan (IMITREX) 25 mg tablet, TAKE 1 TABLET BY MOUTH AT ONSET OF MIGRAINE  MAY REPEAT ONE TIME WITHIN 24 HOURS, Disp: , Rfl: 5    zolpidem (AMBIEN) 10 mg tablet, Take 5 mg by mouth daily , Disp: , Rfl: 5    irbesartan (AVAPRO) 150 mg tablet, irbesartan 150 mg tablet (Patient not taking: Reported on 2/22/2022), Disp: , Rfl:     losartan (COZAAR) 100 MG tablet, Take 100 mg by mouth daily (Patient not taking: Reported on 2/22/2022 ), Disp: , Rfl: 3    Active Problems     Patient Active Problem List   Diagnosis    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    Malignant melanoma of face excluding eyelid, nose, lip, and ear ([address] )    Essential hypertension    Lymphoma, small lymphocytic ([address] )    Family history of cancer    Benign prostatic hyperplasia with lower urinary tract symptoms    Prostate cancer screening    Other male erectile dysfunction       Past Medical History     Past Medical History:   Diagnosis Date    BPH with obstruction/lower urinary tract symptoms 2016    [facility] [address] [facility]     Corporo-venous occlusive erectile dysfunction 2016    Cyst, kidney, acquired 2016    Hearing loss     asymmetric left sided    Hyperlipidemia     Hypertension     Skin cancer     melanoma       Surgical History     Past Surgical History:   Procedure Laterality Date    BACK SURGERY  2016    EAR SURGERY Left     transtympanic steroid injections x 3 - 2017    SKIN CANCER EXCISION      facial melanoma - [facility]       Family History     Family History   Problem Relation Age of Onset    Pancreatic cancer Father     Skin cancer Sister     Breast cancer Sister     Melanoma Brother        Social History     Social History     Social History     Tobacco Use   Smoking Status Former Smoker    Packs/day: 1 00    Types: Cigarettes    Start date: 1967 Pall Quit date: 26    Years since quittin 1   Smokeless Tobacco Never Used       Past Surgical History:   Procedure Laterality Date    BACK SURGERY  2016    EAR SURGERY Left     transtympanic steroid injections x 3 - 2017    SKIN CANCER EXCISION      facial melanoma - [facility]         The following portions of the patient's history were reviewed and updated as appropriate: allergies, current medications, past family history, past medical history, past social history, past surgical history and problem list    Please note :  Voice dictation software has been used to create this document  There may be inadvertent transcription errors      Margareth Floyd

## 2022-06-20 ENCOUNTER — TELEPHONE (OUTPATIENT)
Dept: HEMATOLOGY ONCOLOGY | Facility: CLINIC | Age: 77
End: 2022-06-20

## 2022-06-20 DIAGNOSIS — C43.30 MALIGNANT MELANOMA OF FACE EXCLUDING EYELID, NOSE, LIP, AND EAR (HCC): Primary | ICD-10-CM

## 2022-06-23 ENCOUNTER — OFFICE VISIT (OUTPATIENT)
Dept: HEMATOLOGY ONCOLOGY | Facility: CLINIC | Age: 77
End: 2022-06-23
Payer: MEDICARE

## 2022-06-23 VITALS
HEART RATE: 69 BPM | RESPIRATION RATE: 18 BRPM | WEIGHT: 180 LBS | BODY MASS INDEX: 25.77 KG/M2 | OXYGEN SATURATION: 96 % | HEIGHT: 70 IN | TEMPERATURE: 97.6 F | DIASTOLIC BLOOD PRESSURE: 86 MMHG | SYSTOLIC BLOOD PRESSURE: 138 MMHG

## 2022-06-23 DIAGNOSIS — C43.30 MALIGNANT MELANOMA OF FACE EXCLUDING EYELID, NOSE, LIP, AND EAR (HCC): Primary | ICD-10-CM

## 2022-06-23 PROCEDURE — 99213 OFFICE O/P EST LOW 20 MIN: CPT | Performed by: INTERNAL MEDICINE

## 2022-06-23 RX ORDER — LORAZEPAM 0.5 MG/1
TABLET ORAL
COMMUNITY
Start: 2022-04-22

## 2022-06-23 RX ORDER — ALUMINUM CHLORIDE 20 %
SOLUTION, NON-ORAL TOPICAL
COMMUNITY
Start: 2022-06-09

## 2022-06-23 RX ORDER — METHYLPHENIDATE HYDROCHLORIDE 10 MG/1
TABLET ORAL
COMMUNITY
Start: 2022-05-05

## 2022-06-23 RX ORDER — TRIAMCINOLONE ACETONIDE 40 MG/ML
1 INJECTION, SUSPENSION INTRA-ARTICULAR; INTRAMUSCULAR
COMMUNITY

## 2022-06-23 NOTE — LETTER
June 25, 2022     Flavio Torres MD  [address]    Patient: Salvador Sheldon   YOB: 1945   Date of Visit: 6/23/2022       Dear Dr Herve Pierre: Thank you for referring Lang Richards to me for evaluation  Below are my notes for this consultation  If you have questions, please do not hesitate to call me  I look forward to following your patient along with you  Sincerely,        Viktoriya Mckeon MD        CC: MD Viktoriya Mcfadden MD  6/25/2022  7:44 AM  Sign when Signing Visit  [address] [facility] [address]  [address] [place] [address] 58  [phone]  [phone]     Date of Visit: 6/23/2022  Name: Salvador Sheldon   YOB: 1945        Subjective    VISIT DIAGNOSIS:  Diagnoses and all orders for this visit:    Malignant melanoma of face excluding eyelid, nose, lip, and ear ([address] )  -     LD,Blood;  Future    Other orders  -     methylphenidate (RITALIN) 10 mg tablet  -     LORazepam (ATIVAN) 0 5 mg tablet  -     Drysol 20 % external solution; APPLY ONCE OR TWICE WEEKLY AT BEDTIME, WASH OFF IN THE MORNING  -     triamcinolone acetonide (KENALOG-40) 40 mg/mL; 1 mL        Oncology History   Malignant melanoma of face excluding eyelid, nose, lip, and ear ([address] )   8/12/2010 -  Cancer Staged    Staging form: Melanoma of the Skin, AJCC 6th Edition  - Clinical stage from 8/12/2010: Stage 0 (Tis, N0, M0) - Signed by Viktoriya Mckeon MD on 6/23/2021 8/12/2010 Surgery    Moh's surgery with Dr Carolina Bee at [facility]     11/20/2018 Initial Diagnosis    Malignant melanoma of face excluding eyelid, nose, lip, and ear ([address] )        Cancer Staging  Malignant melanoma of face excluding eyelid, nose, lip, and ear ([address] )  Staging form: Melanoma of the Skin, AJCC 6th Edition  - Clinical stage from 8/12/2010: Stage 0 (Tis, N0, M0) - Signed by Viktoriya Mckeon MD on 6/23/2021         HISTORY OF PRESENT ILLNESS: Cleta Dubin is a 78 y o  male  who has melanoma in-situ the seen in follow-up  He is doing well and feels good  He continues with close Dermatology follow-up  He is concerned about this blue hue of the tissue with some punctate areas underneath his left eye just lateral to his excision site  He states that Dermatology told him it was all vascular in nature  He denies any new, changing, concerning skin lesions at this time  He denies any lymphadenopathy  REVIEW OF SYSTEMS:  Review of Systems   Constitutional: Negative for appetite change, fatigue, fever and unexpected weight change  HENT:   Negative for lump/mass  Eyes: Negative for icterus  Skin changes/vascular changes below left eye lateral to scar   Respiratory: Negative for cough, shortness of breath and wheezing  Cardiovascular: Negative for leg swelling  Gastrointestinal: Negative for abdominal pain, constipation, diarrhea, nausea and vomiting  Genitourinary: Negative for difficulty urinating and hematuria  Musculoskeletal: Negative for arthralgias, gait problem and myalgias  Skin: Negative for itching and rash  No new, changing, or concerning lesions  Neurological: Negative for extremity weakness, gait problem, headaches, light-headedness and numbness  Hematological: Negative for adenopathy          MEDICATIONS:    Current Outpatient Medications:     amLODIPine (NORVASC) 5 mg tablet, 5 mg, Disp: , Rfl:     atorvastatin (LIPITOR) 10 mg tablet, Take 10 mg by mouth daily, Disp: , Rfl: 3    Drysol 20 % external solution, APPLY ONCE OR TWICE WEEKLY AT BEDTIME, WASH OFF IN THE MORNING, Disp: , Rfl:     FLUoxetine (PROzac) 40 MG capsule, Prozac, Disp: , Rfl:     irbesartan (AVAPRO) 150 mg tablet, irbesartan 150 mg tablet, Disp: , Rfl:     LORazepam (ATIVAN) 0 5 mg tablet, , Disp: , Rfl:     losartan (COZAAR) 100 MG tablet, Take 100 mg by mouth daily, Disp: , Rfl: 3    methylphenidate (RITALIN) 10 mg tablet, , Disp: , Rfl:     metoprolol succinate (TOPROL-XL) 25 mg 24 hr tablet, Take 25 mg by mouth daily, Disp: , Rfl:     SUMAtriptan (IMITREX) 25 mg tablet, TAKE 1 TABLET BY MOUTH AT ONSET OF MIGRAINE  MAY REPEAT ONE TIME WITHIN 24 HOURS, Disp: , Rfl: 5    triamcinolone acetonide (KENALOG-40) 40 mg/mL, 1 mL, Disp: , Rfl:     zolpidem (AMBIEN) 10 mg tablet, Take 5 mg by mouth daily , Disp: , Rfl: 5     ALLERGIES:  Allergies   Allergen Reactions    Percocet  [Oxycodone-Acetaminophen] Rash        ACTIVE PROBLEMS:  Patient Active Problem List   Diagnosis    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    Malignant melanoma of face excluding eyelid, nose, lip, and ear ([facility] [address] [facility]    Essential hypertension    Lymphoma, small lymphocytic ([facility] [address] )    Family history of cancer    Benign prostatic hyperplasia with lower urinary tract symptoms    Prostate cancer screening    Other male erectile dysfunction          PAST MEDICAL HISTORY:   Past Medical History:   Diagnosis Date    BPH with obstruction/lower urinary tract symptoms 2016    CLL (chronic lymphocytic leukemia) ([address] )     Corporo-venous occlusive erectile dysfunction 2016    Cyst, kidney, acquired 2016    Hearing loss     asymmetric left sided    Hyperlipidemia     Hypertension     Skin cancer     melanoma        PAST SURGICAL HISTORY:  Past Surgical History:   Procedure Laterality Date    BACK SURGERY  2016    EAR SURGERY Left     transtympanic steroid injections x 3 - November 2017    SKIN CANCER EXCISION      facial melanoma - [facility]        SOCIAL HISTORY:  Social History     Socioeconomic History    Marital status: /Civil Union     Spouse name: None    Number of children: None    Years of education: None    Highest education level: None   Occupational History    None   Tobacco Use    Smoking status: Former Smoker     Packs/day: 1 00     Types: Cigarettes     Start date: 6/18/1967     Quit date: 1987     Years since quittin 5    Smokeless tobacco: Never Used   Vaping Use    Vaping Use: Never used   Substance and Sexual Activity    Alcohol use: Yes     Alcohol/week: 14 0 standard drinks     Types: 14 Glasses of wine per week    Drug use: Yes     Types: Marijuana     Comment: occasional    Sexual activity: None   Other Topics Concern    None   Social History Narrative    Consumes 3-4 cups of coffee per day     Social Determinants of Health     Financial Resource Strain: Not on file   Food Insecurity: Not on file   Transportation Needs: Not on file   Physical Activity: Not on file   Stress: Not on file   Social Connections: Not on file   Intimate Partner Violence: Not on file   Housing Stability: Not on file        FAMILY HISTORY:  Family History   Problem Relation Age of Onset    Pancreatic cancer Father     Skin cancer Sister     Breast cancer Sister     Melanoma Brother            Objective    PHYSICAL EXAMINATION:   Blood pressure 138/86, pulse 69, temperature 97 6 °F (36 4 °C), resp  rate 18, height 5' 10" (1 778 m), weight 81 6 kg (180 lb), SpO2 96 %  Pain Score: 0-No pain      ECOG Performance Status    Flowsheet Row Most Recent Value   ECOG Performance Status 0 - Fully active, able to carry on all pre-disease performance without restriction            Physical Exam  Constitutional:       General: He is not in acute distress  Appearance: Normal appearance  He is not toxic-appearing  HENT:      Mouth/Throat:      Mouth: Mucous membranes are moist       Pharynx: Oropharynx is clear  Eyes:      General: No scleral icterus  Cardiovascular:      Rate and Rhythm: Normal rate and regular rhythm  Pulses: Normal pulses  Heart sounds: No murmur heard  No friction rub  No gallop  Pulmonary:      Effort: Pulmonary effort is normal  No respiratory distress  Breath sounds: Normal breath sounds  No wheezing or rales     Chest:   Breasts:      Right: No axillary adenopathy or supraclavicular adenopathy  Left: No axillary adenopathy or supraclavicular adenopathy  Abdominal:      General: There is no distension  Palpations: There is no mass  Tenderness: There is no abdominal tenderness  There is no rebound  Musculoskeletal:         General: No swelling or tenderness  Right lower leg: No edema  Left lower leg: No edema  Lymphadenopathy:      Head:      Right side of head: No submandibular, preauricular or posterior auricular adenopathy  Left side of head: No submandibular, preauricular or posterior auricular adenopathy  Cervical: No cervical adenopathy  Right cervical: No superficial or posterior cervical adenopathy  Left cervical: No superficial or posterior cervical adenopathy  Upper Body:      Right upper body: No supraclavicular or axillary adenopathy  Left upper body: No supraclavicular or axillary adenopathy  Lower Body: No right inguinal adenopathy  No left inguinal adenopathy  Skin:     Findings: No rash  Comments: Well healed surgical scar  No evidence of recurrence at primary site  Neurological:      General: No focal deficit present  Mental Status: He is alert and oriented to person, place, and time  Psychiatric:         Mood and Affect: Mood normal          Behavior: Behavior normal          Thought Content: Thought content normal          Judgment: Judgment normal      Exam with dermatoscope demonstrates vascular nature of the area underneath left eye lateral to scar  I reviewed lab data in the chart - in [facility]  He has all blood work except LDH  RECENT IMAGING:  No imaging        Assessment/Plan  Mr Franny Rodriguez is a 78 yr male with melanoma in-situ here for follow-up and surveillance  1  Malignant melanoma of face excluding eyelid, nose, lip, and ear ([address] )  He is doing well and no evidence of recurrence of the melanoma at the primary site underneath his left eye medially    No new, changing, concerning skin lesions at this time either  Has close derm follow-up  I did ask that he get an LDH as that lab was not drawn with his other blood work  He will have this drawn with his other hematologist oncologist at [facility] and where he sees an for his CLL  He will return in one year for follow-up with me  He will continue close Dermatology follow-up  We will use labs drawn by his primary care doctor and or other hematologist/oncologist     He knows to call with any issues or concerns prior to his next visit     - LD,Blood;  Future

## 2022-06-25 NOTE — PROGRESS NOTES
[facility] H[facility]ATOLOGY ONCOLOGY SPECIALISTS [facility]  [address] [facility] [id number] [phone]     Date of Visit: 6/23/2022  Name: Arnold Norwood   YOB: 1945        Subjective    VISIT DIAGNOSIS:  Diagnoses and all orders for this visit:    Malignant melanoma of face excluding eyelid, nose, lip, and ear ([facility] [address] )  -     LD,Blood; Future    Other orders  -     methylphenidate (RITALIN) 10 mg tablet  -     LORazepam (ATIVAN) 0 5 mg tablet  -     Drysol 20 % external solution; APPLY ONCE OR TWICE WEEKLY AT BEDTIME, WASH OFF IN THE MORNING  -     triamcinolone acetonide (KENALOG-40) 40 mg/mL; 1 mL        Oncology History   Malignant melanoma of face excluding eyelid, nose, lip, and ear ([facility] [address] )   8/12/2010 -  Cancer Staged    Staging form: Melanoma of the Skin, AJCC 6th Edition  - Clinical stage from 8/12/2010: Stage 0 (Tis, N0, M0) - Signed by Vilma Bansal MD on 6/23/2021 8/12/2010 Surgery    Moh's surgery with Dr Lloyd Sierra at [facility]     11/20/2018 Initial Diagnosis    Malignant melanoma of face excluding eyelid, nose, lip, and ear ([facility] [address] )        Cancer Staging  Malignant melanoma of face excluding eyelid, nose, lip, and ear ([facility] [address] )  Staging form: Melanoma of the Skin, AJCC 6th Edition  - Clinical stage from 8/12/2010: Stage 0 (Tis, N0, M0) - Signed by iVlma Bansal MD on 6/23/2021         HISTORY OF PRESENT ILLNESS: Arnold Norwood is a 78 y o  male  who has melanoma in-situ the seen in follow-up  He is doing well and feels good  He continues with close Dermatology follow-up  He is concerned about this blue hue of the tissue with some punctate areas underneath his left eye just lateral to his excision site  He states that Dermatology told him it was all vascular in nature  He denies any new, changing, concerning skin lesions at this time  He denies any lymphadenopathy          REVIEW OF SYSTEMS:  Review of Systems   Constitutional: Negative for appetite change, fatigue, fever and unexpected weight change  HENT:   Negative for lump/mass  Eyes: Negative for icterus  Skin changes/vascular changes below left eye lateral to scar   Respiratory: Negative for cough, shortness of breath and wheezing  Cardiovascular: Negative for leg swelling  Gastrointestinal: Negative for abdominal pain, constipation, diarrhea, nausea and vomiting  Genitourinary: Negative for difficulty urinating and hematuria  Musculoskeletal: Negative for arthralgias, gait problem and myalgias  Skin: Negative for itching and rash  No new, changing, or concerning lesions  Neurological: Negative for extremity weakness, gait problem, headaches, light-headedness and numbness  Hematological: Negative for adenopathy          MEDICATIONS:    Current Outpatient Medications:     amLODIPine (NORVASC) 5 mg tablet, 5 mg, Disp: , Rfl:     atorvastatin (LIPITOR) 10 mg tablet, Take 10 mg by mouth daily, Disp: , Rfl: 3    Drysol 20 % external solution, APPLY ONCE OR TWICE WEEKLY AT BEDTIME, WASH OFF IN THE MORNING, Disp: , Rfl:     FLUoxetine (PROzac) 40 MG capsule, Prozac, Disp: , Rfl:     irbesartan (AVAPRO) 150 mg tablet, irbesartan 150 mg tablet, Disp: , Rfl:     LORazepam (ATIVAN) 0 5 mg tablet, , Disp: , Rfl:     losartan (COZAAR) 100 MG tablet, Take 100 mg by mouth daily, Disp: , Rfl: 3    methylphenidate (RITALIN) 10 mg tablet, , Disp: , Rfl:     metoprolol succinate (TOPROL-XL) 25 mg 24 hr tablet, Take 25 mg by mouth daily, Disp: , Rfl:     SUMAtriptan (IMITREX) 25 mg tablet, TAKE 1 TABLET BY MOUTH AT ONSET OF MIGRAINE  MAY REPEAT ONE TIME WITHIN 24 HOURS, Disp: , Rfl: 5    triamcinolone acetonide (KENALOG-40) 40 mg/mL, 1 mL, Disp: , Rfl:     zolpidem (AMBIEN) 10 mg tablet, Take 5 mg by mouth daily , Disp: , Rfl: 5     ALLERGIES:  Allergies   Allergen Reactions    Percocet  [Oxycodone-Acetaminophen] Rash        ACTIVE PROBLEMS:  Patient Active Problem List Diagnosis    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    Malignant melanoma of face excluding eyelid, nose, lip, and ear ([facility] [address] )    Essential hypertension    Lymphoma, small lymphocytic ([facility] [address] )    Family history of cancer    Benign prostatic hyperplasia with lower urinary tract symptoms    Prostate cancer screening    Other male erectile dysfunction          PAST MEDICAL HISTORY:   Past Medical History:   Diagnosis Date    BPH with obstruction/lower urinary tract symptoms 2016    CLL (chronic lymphocytic leukemia) ([facility] [address] )     Corporo-venous occlusive erectile dysfunction 2016    Cyst, kidney, acquired 2016    Hearing loss     asymmetric left sided    Hyperlipidemia     Hypertension     Skin cancer     melanoma        PAST SURGICAL HISTORY:  Past Surgical History:   Procedure Laterality Date    BACK SURGERY  2016    EAR SURGERY Left     transtympanic steroid injections x 3 - 2017    SKIN CANCER EXCISION      facial melanoma - [facility]        SOCIAL HISTORY:  Social History     Socioeconomic History    Marital status: /Civil Union     Spouse name: None    Number of children: None    Years of education: None    Highest education level: None   Occupational History    None   Tobacco Use    Smoking status: Former Smoker     Packs/day: 1 00     Types: Cigarettes     Start date: 1967     Quit date:      Years since quittin 5    Smokeless tobacco: Never Used   Vaping Use    Vaping Use: Never used   Substance and Sexual Activity    Alcohol use:  Yes     Alcohol/week: 14 0 standard drinks     Types: 14 Glasses of wine per week    Drug use: Yes     Types: Marijuana     Comment: occasional    Sexual activity: None   Other Topics Concern    None   Social History Narrative    Consumes 3-4 cups of coffee per day     Social Determinants of Health     Financial Resource Strain: Not on file   Food Insecurity: Not on file   Transportation Needs: Not on file Physical Activity: Not on file   Stress: Not on file   Social Connections: Not on file   Intimate Partner Violence: Not on file   Housing Stability: Not on file        FAMILY HISTORY:  Family History   Problem Relation Age of Onset    Pancreatic cancer Father     Skin cancer Sister     Breast cancer Sister     Melanoma Brother            Objective    PHYSICAL EXAMINATION:   Blood pressure 138/86, pulse 69, temperature 97 6 °F (36 4 °C), resp  rate 18, height 5' 10" (1 778 m), weight 81 6 kg (180 lb), SpO2 96 %  Pain Score: 0-No pain      ECOG Performance Status    Flowsheet Row Most Recent Value   ECOG Performance Status 0 - Fully active, able to carry on all pre-disease performance without restriction            Physical Exam  Constitutional:       General: He is not in acute distress  Appearance: Normal appearance  He is not toxic-appearing  HENT:      Mouth/Throat:      Mouth: Mucous membranes are moist       Pharynx: Oropharynx is clear  Eyes:      General: No scleral icterus  Cardiovascular:      Rate and Rhythm: Normal rate and regular rhythm  Pulses: Normal pulses  Heart sounds: No murmur heard  No friction rub  No gallop  Pulmonary:      Effort: Pulmonary effort is normal  No respiratory distress  Breath sounds: Normal breath sounds  No wheezing or rales  Chest:   Breasts:      Right: No axillary adenopathy or supraclavicular adenopathy  Left: No axillary adenopathy or supraclavicular adenopathy  Abdominal:      General: There is no distension  Palpations: There is no mass  Tenderness: There is no abdominal tenderness  There is no rebound  Musculoskeletal:         General: No swelling or tenderness  Right lower leg: No edema  Left lower leg: No edema  Lymphadenopathy:      Head:      Right side of head: No submandibular, preauricular or posterior auricular adenopathy        Left side of head: No submandibular, preauricular or posterior auricular adenopathy  Cervical: No cervical adenopathy  Right cervical: No superficial or posterior cervical adenopathy  Left cervical: No superficial or posterior cervical adenopathy  Upper Body:      Right upper body: No supraclavicular or axillary adenopathy  Left upper body: No supraclavicular or axillary adenopathy  Lower Body: No right inguinal adenopathy  No left inguinal adenopathy  Skin:     Findings: No rash  Comments: Well healed surgical scar  No evidence of recurrence at primary site  Neurological:      General: No focal deficit present  Mental Status: He is alert and oriented to person, place, and time  Psychiatric:         Mood and Affect: Mood normal          Behavior: Behavior normal          Thought Content: Thought content normal          Judgment: Judgment normal      Exam with dermatoscope demonstrates vascular nature of the area underneath left eye lateral to scar  I reviewed lab data in the chart - in [facility]  He has all blood work except LDH  RECENT IMAGING:  No imaging        Assessment/Plan  Mr Maren Hurtado is a 78 yr male with melanoma in-situ here for follow-up and surveillance  1  Malignant melanoma of face excluding eyelid, nose, lip, and ear ([address] )  He is doing well and no evidence of recurrence of the melanoma at the primary site underneath his left eye medially  No new, changing, concerning skin lesions at this time either  Has close derm follow-up  I did ask that he get an LDH as that lab was not drawn with his other blood work  He will have this drawn with his other hematologist oncologist at [facility] and where he sees an for his CLL  He will return in one year for follow-up with me  He will continue close Dermatology follow-up      We will use labs drawn by his primary care doctor and or other hematologist/oncologist     He knows to call with any issues or concerns prior to his next visit     - LD,Blood; Future

## 2022-07-11 ENCOUNTER — APPOINTMENT (OUTPATIENT)
Dept: LAB | Facility: HOSPITAL | Age: 77
End: 2022-07-11
Payer: MEDICARE

## 2022-07-11 DIAGNOSIS — C43.30 MALIGNANT MELANOMA OF FACE EXCLUDING EYELID, NOSE, LIP, AND EAR (HCC): ICD-10-CM

## 2022-07-11 LAB — LDH SERPL-CCNC: 266 U/L (ref 81–234)

## 2022-07-11 PROCEDURE — 36415 COLL VENOUS BLD VENIPUNCTURE: CPT

## 2022-07-11 PROCEDURE — 83615 LACTATE (LD) (LDH) ENZYME: CPT

## 2022-08-12 ENCOUNTER — TELEPHONE (OUTPATIENT)
Dept: HEMATOLOGY ONCOLOGY | Facility: CLINIC | Age: 77
End: 2022-08-12

## 2022-08-12 DIAGNOSIS — C43.30 MALIGNANT MELANOMA OF FACE EXCLUDING EYELID, NOSE, LIP, AND EAR (HCC): Primary | ICD-10-CM

## 2022-08-12 NOTE — TELEPHONE ENCOUNTER
----- Message from Clover Lagunas MD sent at 8/12/2022  2:35 PM EDT -----  Can we have him repeat this to ensure it isn't continuing to increase?   Thanks

## 2022-08-12 NOTE — TELEPHONE ENCOUNTER
Called and spoke with patient  Discussed elevated LD  He will repeat in 2 weeks and is aware I will call with results  He will complete this at a [facility]'s lab  Order emailed to patient at [email or web address]  [email or web address] per patient request

## 2022-08-25 ENCOUNTER — TELEPHONE (OUTPATIENT)
Dept: HEMATOLOGY ONCOLOGY | Facility: CLINIC | Age: 77
End: 2022-08-25

## 2022-08-25 ENCOUNTER — APPOINTMENT (OUTPATIENT)
Dept: LAB | Facility: HOSPITAL | Age: 77
End: 2022-08-25
Payer: MEDICARE

## 2022-08-25 DIAGNOSIS — C43.30 MALIGNANT MELANOMA OF FACE EXCLUDING EYELID, NOSE, LIP, AND EAR (HCC): ICD-10-CM

## 2022-08-25 LAB — LDH SERPL-CCNC: 210 U/L (ref 81–234)

## 2022-08-25 PROCEDURE — 83615 LACTATE (LD) (LDH) ENZYME: CPT

## 2022-08-25 PROCEDURE — 36415 COLL VENOUS BLD VENIPUNCTURE: CPT

## 2022-08-25 NOTE — TELEPHONE ENCOUNTER
Called and spoke with patient  Discussed LD is back to normal  All questions answered and patient verbalized understanding

## 2022-08-25 NOTE — TELEPHONE ENCOUNTER
----- Message from Leann Salter MD sent at 8/25/2022  4:01 PM EDT -----  Can you let him know that repeat LDH is good  Thanks   m

## 2022-10-11 PROBLEM — Z12.5 PROSTATE CANCER SCREENING: Status: RESOLVED | Noted: 2022-02-22 | Resolved: 2022-10-11

## 2022-11-08 ENCOUNTER — APPOINTMENT (OUTPATIENT)
Dept: LAB | Facility: HOSPITAL | Age: 77
End: 2022-11-08

## 2022-11-08 DIAGNOSIS — E78.00 PURE HYPERCHOLESTEROLEMIA: ICD-10-CM

## 2022-11-08 DIAGNOSIS — I10 ESSENTIAL HYPERTENSION, MALIGNANT: ICD-10-CM

## 2022-11-08 DIAGNOSIS — R94.5 NONSPECIFIC ABNORMAL RESULTS OF LIVER FUNCTION STUDY: ICD-10-CM

## 2022-11-08 LAB
ALBUMIN SERPL BCP-MCNC: 3.8 G/DL (ref 3.5–5)
ALP SERPL-CCNC: 81 U/L (ref 46–116)
ALT SERPL W P-5'-P-CCNC: 56 U/L (ref 12–78)
ANION GAP SERPL CALCULATED.3IONS-SCNC: 6 MMOL/L (ref 4–13)
AST SERPL W P-5'-P-CCNC: 29 U/L (ref 5–45)
BILIRUB SERPL-MCNC: 0.79 MG/DL (ref 0.2–1)
BUN SERPL-MCNC: 11 MG/DL (ref 5–25)
CALCIUM SERPL-MCNC: 9.1 MG/DL (ref 8.3–10.1)
CHLORIDE SERPL-SCNC: 105 MMOL/L (ref 96–108)
CHOLEST SERPL-MCNC: 160 MG/DL
CO2 SERPL-SCNC: 27 MMOL/L (ref 21–32)
CREAT SERPL-MCNC: 0.82 MG/DL (ref 0.6–1.3)
ERYTHROCYTE [DISTWIDTH] IN BLOOD BY AUTOMATED COUNT: 14.2 % (ref 11.6–15.1)
GFR SERPL CREATININE-BSD FRML MDRD: 86 ML/MIN/1.73SQ M
GLUCOSE P FAST SERPL-MCNC: 103 MG/DL (ref 65–99)
HCT VFR BLD AUTO: 46.1 % (ref 36.5–49.3)
HDLC SERPL-MCNC: 77 MG/DL
HGB BLD-MCNC: 15.8 G/DL (ref 12–17)
LDLC SERPL CALC-MCNC: 66 MG/DL (ref 0–100)
MCH RBC QN AUTO: 32.1 PG (ref 26.8–34.3)
MCHC RBC AUTO-ENTMCNC: 34.3 G/DL (ref 31.4–37.4)
MCV RBC AUTO: 94 FL (ref 82–98)
NONHDLC SERPL-MCNC: 83 MG/DL
PLATELET # BLD AUTO: 240 THOUSANDS/UL (ref 149–390)
PMV BLD AUTO: 9.7 FL (ref 8.9–12.7)
POTASSIUM SERPL-SCNC: 3.9 MMOL/L (ref 3.5–5.3)
PROT SERPL-MCNC: 7.3 G/DL (ref 6.4–8.4)
RBC # BLD AUTO: 4.92 MILLION/UL (ref 3.88–5.62)
SODIUM SERPL-SCNC: 138 MMOL/L (ref 135–147)
TRIGL SERPL-MCNC: 86 MG/DL
WBC # BLD AUTO: 23.23 THOUSAND/UL (ref 4.31–10.16)

## 2022-11-15 LAB
BASOPHILS NFR MAR MANUAL: 0 % (ref 0–1)
EOSINOPHIL NFR BLD MANUAL: 0 % (ref 0–6)
LYMPHOCYTES # BLD AUTO: 55 % (ref 14–44)
MONOCYTES NFR BLD: 3 % (ref 4–12)
NEUTS SEG NFR BLD AUTO: 25 % (ref 43–75)
PATHOLOGY REVIEW: YES
PLATELET BLD QL SMEAR: ADEQUATE
RBC MORPH BLD: NORMAL
TOTAL CELLS COUNTED SPEC: 100
VARIANT LYMPHS # BLD AUTO: 17 %

## 2022-11-22 LAB — SCAN RESULT: NORMAL

## 2022-11-29 LAB — SCAN RESULT: NORMAL

## 2022-12-05 LAB — MISCELLANEOUS LAB TEST RESULT: NORMAL

## 2023-06-23 ENCOUNTER — TELEPHONE (OUTPATIENT)
Dept: HEMATOLOGY ONCOLOGY | Facility: CLINIC | Age: 78
End: 2023-06-23

## 2023-06-23 DIAGNOSIS — C43.30 MALIGNANT MELANOMA OF FACE EXCLUDING EYELID, NOSE, LIP, AND EAR (HCC): Primary | ICD-10-CM

## 2023-06-26 ENCOUNTER — TELEPHONE (OUTPATIENT)
Dept: HEMATOLOGY ONCOLOGY | Facility: CLINIC | Age: 78
End: 2023-06-26

## 2023-06-26 NOTE — TELEPHONE ENCOUNTER
Lab Inquiry   Who are you speaking with? self     If it is not the patient, are they listed on an active communication consent form? self   Name of ordering provider Jody Garridoing   What is being requested? Please send updated orders to [facility] at [facility]   Lab draw location [facility]   What is the best call back number?  [phone]

## 2023-06-29 ENCOUNTER — OFFICE VISIT (OUTPATIENT)
Dept: HEMATOLOGY ONCOLOGY | Facility: CLINIC | Age: 78
End: 2023-06-29
Payer: MEDICARE

## 2023-06-29 VITALS
RESPIRATION RATE: 16 BRPM | DIASTOLIC BLOOD PRESSURE: 70 MMHG | HEIGHT: 70 IN | WEIGHT: 175 LBS | SYSTOLIC BLOOD PRESSURE: 122 MMHG | BODY MASS INDEX: 25.05 KG/M2 | OXYGEN SATURATION: 98 % | TEMPERATURE: 97.3 F | HEART RATE: 66 BPM

## 2023-06-29 DIAGNOSIS — C83.00 LYMPHOMA, SMALL LYMPHOCYTIC (HCC): ICD-10-CM

## 2023-06-29 DIAGNOSIS — C43.30 MALIGNANT MELANOMA OF FACE EXCLUDING EYELID, NOSE, LIP, AND EAR (HCC): Primary | ICD-10-CM

## 2023-06-29 DIAGNOSIS — Z85.820 ENCOUNTER FOR FOLLOW-UP SURVEILLANCE OF MELANOMA: ICD-10-CM

## 2023-06-29 DIAGNOSIS — Z08 ENCOUNTER FOR FOLLOW-UP SURVEILLANCE OF MELANOMA: ICD-10-CM

## 2023-06-29 PROCEDURE — 99213 OFFICE O/P EST LOW 20 MIN: CPT | Performed by: INTERNAL MEDICINE

## 2023-06-29 NOTE — PROGRESS NOTES
[facility] HEMATOLOGY ONCOLOGY SPECIALISTS [facility] [address] [place] [address]  [phone]     Date of Visit: 6/29/2023  Name: Silviano Soto   YOB: 1945        Subjective    VISIT DIAGNOSIS:  Diagnoses and all orders for this visit:    Malignant melanoma of face excluding eyelid, nose, lip, and ear [facility])        Oncology History   Malignant melanoma of face excluding eyelid, nose, lip, and ear ([address])   8/12/2010 -  Cancer Staged    Staging form: Melanoma of the Skin, AJCC 6th Edition  - Clinical stage from 8/12/2010: Stage 0 (Tis, N0, M0) - Signed by Henny Wright MD on 6/23/2021 8/12/2010 Surgery    Moh's surgery with Dr. Shanice Chance at [facility]     11/20/2018 Initial Diagnosis    Malignant melanoma of face excluding eyelid, nose, lip, and ear ([address])        Cancer Staging  Malignant melanoma of face excluding eyelid, nose, lip, and ear ([address])  Staging form: Melanoma of the Skin, AJCC 6th Edition  - Clinical stage from 8/12/2010: Stage 0 (Tis, N0, M0) - Signed by Henny Wright MD on 6/23/2021         HISTORY OF PRESENT ILLNESS: Silviano Soto is a 70 y.o. male  who has melanoma in-situ status post Mohs surgery in 8/2010. Patient is coming in for follow-up. He is doing well and feels good. He continues with close Dermatology follow-up. Patient does not have any chest pain, shortness of breath abdominal pain, lower leg edema. No other subjective complaints noted  He does have chronic discoloration over the area where they did the mohs surgery on the left side of his face. He sees a hematologist oncologist [facility] for CLL, and they did not do any treatment. Currently just watching. His WBC is normal and his most recent laboratory. Other lab work was unremarkable as well. Patient is worried about increased bruising when giving blood for bloodwork, but no other bruising during his everyday life.  His platelet levels are normal. He was reassured that this is something to just monitor for now. He denies any new, changing, concerning skin lesions at this time. He denies any lymphadenopathy. Review of Systems   Constitutional: Negative for appetite change and fatigue. HENT:   Negative for hearing loss, lump/mass, mouth sores and nosebleeds. Eyes: Negative for eye problems. Respiratory: Negative for chest tightness and cough. Cardiovascular: Negative for chest pain and leg swelling. Gastrointestinal: Negative for abdominal distention, abdominal pain, nausea and vomiting. Endocrine: Negative for hot flashes. Genitourinary: Negative for difficulty urinating. Musculoskeletal: Negative for arthralgias. Skin: Negative for itching. Neurological: Negative for dizziness. Hematological: Negative for adenopathy. Psychiatric/Behavioral: Negative for confusion. The patient is not nervous/anxious.          MEDICATIONS:    Current Outpatient Medications:   •  amLODIPine (NORVASC) 5 mg tablet, 5 mg, Disp: , Rfl:   •  atorvastatin (LIPITOR) 10 mg tablet, Take 10 mg by mouth daily, Disp: , Rfl: 3  •  Drysol 20 % external solution, APPLY ONCE OR TWICE WEEKLY AT BEDTIME, WASH OFF IN THE MORNING, Disp: , Rfl:   •  FLUoxetine (PROzac) 40 MG capsule, Prozac, Disp: , Rfl:   •  irbesartan (AVAPRO) 150 mg tablet, irbesartan 150 mg tablet, Disp: , Rfl:   •  LORazepam (ATIVAN) 0.5 mg tablet, , Disp: , Rfl:   •  losartan (COZAAR) 100 MG tablet, Take 100 mg by mouth daily, Disp: , Rfl: 3  •  methylphenidate (RITALIN) 10 mg tablet, , Disp: , Rfl:   •  metoprolol succinate (TOPROL-XL) 25 mg 24 hr tablet, Take 25 mg by mouth daily, Disp: , Rfl:   •  SUMAtriptan (IMITREX) 25 mg tablet, TAKE 1 TABLET BY MOUTH AT ONSET OF MIGRAINE. 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   • Malignant melanoma of face excluding eyelid, nose, lip, and ear ([address])   • Essential hypertension   • Lymphoma, small lymphocytic ([address])   • Family history of cancer   • Benign prostatic hyperplasia with lower urinary tract symptoms   • Other male erectile dysfunction          PAST MEDICAL HISTORY:   Past Medical History:   Diagnosis Date   • BPH with obstruction/lower urinary tract symptoms    • CLL (chronic lymphocytic leukemia) ([address])    • Corporo-venous occlusive erectile dysfunction    • Cyst, kidney, acquired    • Hearing loss     asymmetric left sided   • Hyperlipidemia    • Hypertension    • Skin cancer     melanoma        PAST SURGICAL HISTORY:  Past Surgical History:   Procedure Laterality Date   • BACK SURGERY     • EAR SURGERY Left     transtympanic steroid injections x 3 - 2017   • SKIN CANCER EXCISION      facial melanoma - [facility]        SOCIAL HISTORY:  Social History     Socioeconomic History   • Marital status: /Civil Union     Spouse name: None   • Number of children: None   • Years of education: None   • Highest education level: None   Occupational History   • None   Tobacco Use   • Smoking status: Former     Packs/day: 1.00     Types: Cigarettes     Start date: 1967     Quit date:      Years since quittin.5   • Smokeless tobacco: Never   Vaping Use   • Vaping Use: Never used   Substance and Sexual Activity   • Alcohol use:  Yes     Alcohol/week: 14.0 standard drinks of alcohol     Types: 14 Glasses of wine per week   • Drug use: Yes     Types: Marijuana     Comment: occasional   • Sexual activity: None   Other Topics Concern   • None   Social History Narrative    Consumes 3-4 cups of coffee per day     Social Determinants of Health     Financial Resource Strain: Not on file   Food Insecurity: Not on file   Transportation Needs: Not on file   Physical Activity: Not on file   Stress: Not on file   Social Connections: Not on file   Intimate Partner Violence: Not on file   Housing Stability: Not on file        FAMILY HISTORY:  Family History   Problem Relation Age of Onset   • Pancreatic cancer Father    • Skin cancer Sister    • Breast cancer Sister    • Melanoma Brother            Objective    PHYSICAL EXAMINATION:   Blood pressure 122/70, pulse 66, temperature (!) 97.3 °F (36.3 °C), temperature source Temporal, resp. rate 16, height 5' 10" (1.778 m), weight 79.4 kg (175 lb), SpO2 98 %. Pain Score: 0-No pain      ECOG Performance Status    Flowsheet Row Most Recent Value   ECOG Performance Status 0 - Fully active, able to carry on all pre-disease performance without restriction            Physical Exam  Vitals and nursing note reviewed. Constitutional:       Appearance: He is well-developed and normal weight. HENT:      Head: Normocephalic and atraumatic. Comments: Discoloration on the left cheek, chronic     Nose: Nose normal.      Mouth/Throat:      Mouth: Mucous membranes are moist.   Eyes:      Conjunctiva/sclera: Conjunctivae normal.   Cardiovascular:      Rate and Rhythm: Normal rate and regular rhythm. Heart sounds: Normal heart sounds. No murmur heard. Pulmonary:      Effort: Pulmonary effort is normal. No respiratory distress. Breath sounds: Normal breath sounds. Abdominal:      General: Abdomen is flat. Palpations: Abdomen is soft. Tenderness: There is no abdominal tenderness. Musculoskeletal:         General: No swelling. Cervical back: Neck supple. Right lower leg: No edema. Left lower leg: No edema. Skin:     General: Skin is warm and dry. Capillary Refill: Capillary refill takes less than 2 seconds. Neurological:      General: No focal deficit present.       Mental Status: He is alert and oriented to person, place, and time. Mental status is at baseline. Psychiatric:         Mood and Affect: Mood normal.           Assessment/Plan  Mr. Karel Patel is a 70 yr male with melanoma in-situ here for follow-up and surveillance. 1. Malignant melanoma of face excluding eyelid, nose, lip, and ear ([address])  He is doing well and no evidence of recurrence of the melanoma at the primary site underneath his left eye medially. No new, changing, concerning skin lesions at this time either. Has close derm follow-up. He will return in one year for follow-up He will continue close Dermatology follow-up. We will use labs drawn by his primary care doctor and or other hematologist/oncologist.    He knows to call with any issues or concerns prior to his next visit.

## 2024-06-27 ENCOUNTER — OFFICE VISIT (OUTPATIENT)
Dept: HEMATOLOGY ONCOLOGY | Facility: CLINIC | Age: 79
End: 2024-06-27
Payer: MEDICARE

## 2024-06-27 VITALS
SYSTOLIC BLOOD PRESSURE: 120 MMHG | TEMPERATURE: 98 F | DIASTOLIC BLOOD PRESSURE: 70 MMHG | WEIGHT: 175 LBS | HEART RATE: 58 BPM | HEIGHT: 70 IN | OXYGEN SATURATION: 99 % | BODY MASS INDEX: 25.05 KG/M2 | RESPIRATION RATE: 18 BRPM

## 2024-06-27 DIAGNOSIS — C43.30 MALIGNANT MELANOMA OF FACE EXCLUDING EYELID, NOSE, LIP, AND EAR (HCC): ICD-10-CM

## 2024-06-27 DIAGNOSIS — C83.00 LYMPHOMA, SMALL LYMPHOCYTIC (HCC): ICD-10-CM

## 2024-06-27 DIAGNOSIS — Z85.820 ENCOUNTER FOR FOLLOW-UP SURVEILLANCE OF MELANOMA: Primary | ICD-10-CM

## 2024-06-27 DIAGNOSIS — Z08 ENCOUNTER FOR FOLLOW-UP SURVEILLANCE OF MELANOMA: Primary | ICD-10-CM

## 2024-06-27 PROCEDURE — 99213 OFFICE O/P EST LOW 20 MIN: CPT | Performed by: INTERNAL MEDICINE

## 2024-06-27 PROCEDURE — G2211 COMPLEX E/M VISIT ADD ON: HCPCS | Performed by: INTERNAL MEDICINE

## 2024-06-27 NOTE — LETTER
July 2, 2024     Vladimir Miller MD  [address] [facility] [address]    Patient: Sanjiv Oscar   YOB: 1945   Date of Visit: 6/27/2024       Dear Dr. Miller:    Thank you for referring Sanjiv Oscar to me for evaluation. Below are my notes for this consultation.    If you have questions, please do not hesitate to call me. I look forward to following your patient along with you.         Sincerely,        Cuca Duarte MD        CC: MD Cuca Hong MD  7/2/2024  5:59 AM  Sign when Signing Visit  [facility] [address] [address]  [phone]  [phone]     Date of Visit: 6/27/2024  Name: Sanjiv Oscar   YOB: 1945        Subjective    VISIT DIAGNOSIS:  Diagnoses and all orders for this visit:    Encounter for follow-up surveillance of melanoma    Malignant melanoma of face excluding eyelid, nose, lip, and ear (HCC)  -     CBC and differential; Standing  -     Comprehensive metabolic panel; Standing  -     LD,Blood; Standing    Lymphoma, small lymphocytic (HCC)  -     CBC and differential; Standing  -     Comprehensive metabolic panel; Standing  -     LD,Blood; Standing        Oncology History   Malignant melanoma of face excluding eyelid, nose, lip, and ear (HCC)   8/12/2010 -  Cancer Staged    Staging form: Melanoma of the Skin, AJCC 6th Edition  - Clinical stage from 8/12/2010: Stage 0 (Tis, N0, M0) - Signed by Cuca Duarte MD on 6/23/2021 8/12/2010 Surgery    Moh's surgery with Dr. Klye Singer at [facility]     11/20/2018 Initial Diagnosis    Malignant melanoma of face excluding eyelid, nose, lip, and ear (HCC)        Cancer Staging   Malignant melanoma of face excluding eyelid, nose, lip, and ear (HCC)  Staging form: Melanoma of the Skin, AJCC 6th Edition  - Clinical stage from 8/12/2010: Stage 0 (Tis, N0, M0) - Signed by Cuca Duarte MD on 6/23/2021          HISTORY  OF PRESENT ILLNESS: Sanjiv Oscar is a 79 y.o. male  who melanoma in situ of the cheek and underlying CLL here for continued monitoring, follow-up and surveillance.      He is doing well.  No issues concerns or complaints.  No new, new, concerning skin lesions.  Does follow with dermatology.  No lymphadenopathy.  No concerns with recurrence at the site of the original melanoma on his cheek.  Status post Mohs at [facility] in 2010.    No fevers, chills, increased infections.  No night sweats.  No unintentional weight loss.  Follows with hematology at [facility] for his CLL.        REVIEW OF SYSTEMS:  Review of Systems   Constitutional:  Negative for appetite change, fatigue, fever and unexpected weight change.   HENT:   Negative for lump/mass, trouble swallowing and voice change.    Eyes:  Negative for icterus.   Respiratory:  Negative for cough, shortness of breath and wheezing.    Cardiovascular:  Negative for leg swelling.   Gastrointestinal:  Negative for abdominal pain, constipation, diarrhea, nausea and vomiting.   Genitourinary:  Negative for difficulty urinating and hematuria.    Musculoskeletal:  Negative for arthralgias, gait problem and myalgias.   Skin:  Negative for itching and rash.        No new, changing, or concerning lesions.   Neurological:  Negative for extremity weakness, gait problem, headaches, light-headedness and numbness.   Hematological:  Negative for adenopathy.        MEDICATIONS:    Current Outpatient Medications:   •  amLODIPine (NORVASC) 5 mg tablet, 5 mg, Disp: , Rfl:   •  atorvastatin (LIPITOR) 10 mg tablet, Take 10 mg by mouth daily, Disp: , Rfl: 3  •  Drysol 20 % external solution, APPLY ONCE OR TWICE WEEKLY AT BEDTIME, WASH OFF IN THE MORNING, Disp: , Rfl:   •  FLUoxetine (PROzac) 40 MG capsule, Prozac, Disp: , Rfl:   •  irbesartan (AVAPRO) 150 mg tablet, irbesartan 150 mg tablet, Disp: , Rfl:   •  LORazepam (ATIVAN) 0.5 mg tablet, , Disp: , Rfl:   •  losartan  (COZAAR) 100 MG tablet, Take 100 mg by mouth daily, Disp: , Rfl: 3  •  methylphenidate (RITALIN) 10 mg tablet, , Disp: , Rfl:   •  metoprolol succinate (TOPROL-XL) 25 mg 24 hr tablet, Take 25 mg by mouth daily, Disp: , Rfl:   •  SUMAtriptan (IMITREX) 25 mg tablet, TAKE 1 TABLET BY MOUTH AT ONSET OF MIGRAINE. 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   • Malignant melanoma of face excluding eyelid, nose, lip, and ear (HCC)   • Essential hypertension   • Lymphoma, small lymphocytic (HCC)   • Family history of cancer   • Benign prostatic hyperplasia with lower urinary tract symptoms   • Other male erectile dysfunction          PAST MEDICAL HISTORY:   Past Medical History:   Diagnosis Date   • BPH with obstruction/lower urinary tract symptoms 2016   • CLL (chronic lymphocytic leukemia) (HCC)    • Corporo-venous occlusive erectile dysfunction 2016   • Cyst, kidney, acquired 2016   • Hearing loss     asymmetric left sided   • Hyperlipidemia    • Hypertension    • Skin cancer     melanoma        PAST SURGICAL HISTORY:  Past Surgical History:   Procedure Laterality Date   • BACK SURGERY  2016   • EAR SURGERY Left     transtympanic steroid injections x 3 - November 2017   • SKIN CANCER EXCISION      facial melanoma - [facility]        SOCIAL HISTORY:  Social History     Socioeconomic History   • Marital status: /Civil Union     Spouse name: None   • Number of children: None   • Years of education: None   • Highest education level: None   Occupational History   • None   Tobacco Use   • Smoking status: Former     Current packs/day: 0.00     Average  "packs/day: 1 pack/day for 19.5 years (19.5 ttl pk-yrs)     Types: Cigarettes     Start date: 1967     Quit date:      Years since quittin.5   • Smokeless tobacco: Never   Vaping Use   • Vaping status: Never Used   Substance and Sexual Activity   • Alcohol use: Yes     Alcohol/week: 14.0 standard drinks of alcohol     Types: 14 Glasses of wine per week   • Drug use: Yes     Types: Marijuana     Comment: occasional   • Sexual activity: None   Other Topics Concern   • None   Social History Narrative    Consumes 3-4 cups of coffee per day     Social Determinants of Health     Financial Resource Strain: Not on file   Food Insecurity: Not on file   Transportation Needs: Not on file   Physical Activity: Not on file   Stress: Not on file   Social Connections: Not on file   Intimate Partner Violence: Not on file   Housing Stability: Not on file        FAMILY HISTORY:  Family History   Problem Relation Age of Onset   • Pancreatic cancer Father    • Skin cancer Sister    • Breast cancer Sister    • Melanoma Brother            Objective    PHYSICAL EXAMINATION:   Blood pressure 120/70, pulse 58, temperature 98 °F (36.7 °C), resp. rate 18, height 5' 10\" (1.778 m), weight 79.4 kg (175 lb), SpO2 99%.     Pain Score: 0-No pain      Physical Exam  Constitutional:       General: He is not in acute distress.     Appearance: Normal appearance. He is not ill-appearing or toxic-appearing.   HENT:      Head: Normocephalic and atraumatic.      Comments: Stable venous congestion in the right cheek superior to resection.  Has remained stable over the past few years no changes.  No concerning features alarming for melanoma recurrence.     Right Ear: External ear normal.      Left Ear: External ear normal.      Nose: Nose normal.      Mouth/Throat:      Mouth: Mucous membranes are moist.      Pharynx: Oropharynx is clear.   Eyes:      General: No scleral icterus.        Right eye: No discharge.         Left eye: No discharge.     "  Conjunctiva/sclera: Conjunctivae normal.   Cardiovascular:      Rate and Rhythm: Normal rate and regular rhythm.      Pulses: Normal pulses.      Heart sounds: No murmur heard.     No friction rub. No gallop.   Pulmonary:      Effort: Pulmonary effort is normal. No respiratory distress.      Breath sounds: Normal breath sounds. No wheezing or rales.   Abdominal:      General: Bowel sounds are normal. There is no distension.      Palpations: There is no mass.      Tenderness: There is no abdominal tenderness. There is no rebound.   Musculoskeletal:         General: No swelling or tenderness.      Cervical back: Normal range of motion. No rigidity.      Right lower leg: No edema.      Left lower leg: No edema.   Lymphadenopathy:      Head:      Right side of head: No submandibular, preauricular or posterior auricular adenopathy.      Left side of head: No submandibular, preauricular or posterior auricular adenopathy.      Cervical: No cervical adenopathy.      Right cervical: No superficial or posterior cervical adenopathy.     Left cervical: No superficial or posterior cervical adenopathy.      Upper Body:      Right upper body: No supraclavicular or axillary adenopathy.      Left upper body: No supraclavicular or axillary adenopathy.   Skin:     General: Skin is warm.      Coloration: Skin is not jaundiced.      Findings: No lesion or rash.      Comments: Well healed surgical scar.  No evidence of recurrence at primary site.   Neurological:      General: No focal deficit present.      Mental Status: He is alert and oriented to person, place, and time.      Cranial Nerves: No cranial nerve deficit.      Motor: No weakness.      Gait: Gait normal.   Psychiatric:         Mood and Affect: Mood normal.         Behavior: Behavior normal.         Thought Content: Thought content normal.         Judgment: Judgment normal.         I reviewed lab data in the chart.    WBC   Date Value Ref Range Status   11/08/2022 23.23 (H)  4.31 - 10.16 Thousand/uL Final     Hemoglobin   Date Value Ref Range Status   11/08/2022 15.8 12.0 - 17.0 g/dL Final     Platelets   Date Value Ref Range Status   11/08/2022 240 149 - 390 Thousands/uL Final     MCV   Date Value Ref Range Status   11/08/2022 94 82 - 98 fL Final      Potassium   Date Value Ref Range Status   08/14/2023 5.3 (H) 3.5 - 5.2 mmol/L Final   04/24/2023 4.6 3.5 - 5.2 mmol/L Final   11/10/2022 4.7 3.5 - 5.2 mmol/L Final     Chloride   Date Value Ref Range Status   08/14/2023 103 100 - 109 mmol/L Final   04/24/2023 104 100 - 109 mmol/L Final   11/10/2022 105 100 - 109 mmol/L Final     Carbon Dioxide   Date Value Ref Range Status   08/14/2023 28 21 - 31 mmol/L Final   04/24/2023 27 21 - 31 mmol/L Final   11/10/2022 30 23 - 31 mmol/L Final     BUN   Date Value Ref Range Status   08/14/2023 15 7 - 28 mg/dL Final   04/24/2023 11 7 - 28 mg/dL Final   11/10/2022 13 7 - 28 mg/dL Final     Creatinine   Date Value Ref Range Status   08/14/2023 0.77 0.53 - 1.30 mg/dL Final   04/24/2023 0.78 0.53 - 1.30 mg/dL Final   11/10/2022 0.91 0.53 - 1.30 mg/dL Final     Glucose   Date Value Ref Range Status   08/14/2023 104 (H) 65 - 99 mg/dL Final   04/24/2023 93 65 - 99 mg/dL Final   11/10/2022 97 65 - 99 mg/dL Final     eGFR, Non-   Date Value Ref Range Status   12/02/2021 98 >60 Final   04/10/2021 90 >60 Final     Comment:     Specimen moderately hemolyzed, results may be affected.   10/29/2019 86 > OR = 60 mL/min/1.73m2 Final     eGFR,    Date Value Ref Range Status   12/02/2021 114 >60 Final   04/10/2021 104 >60 Final     Comment:     Specimen moderately hemolyzed, results may be affected.     Calcium   Date Value Ref Range Status   08/14/2023 9.3 8.5 - 10.1 mg/dL Final   04/24/2023 9.3 8.5 - 10.1 mg/dL Final   11/10/2022 9.5 8.5 - 10.1 mg/dL Final     ALBUMIN   Date Value Ref Range Status   08/14/2023 4.2 3.5 - 5.7 g/dL Final   04/24/2023 4.3 3.5 - 5.7 g/dL Final   11/10/2022  "4.2 3.5 - 4.8 g/dL Final     Total Bilirubin   Date Value Ref Range Status   08/14/2023 0.8 0.2 - 1.0 mg/dL Final     Comment:     Eltrombopag and its metabolites may interfere with this assay causing erroneously high patient results.   04/24/2023 1.0 0.2 - 1.0 mg/dL Final     Comment:     Eltrombopag and its metabolites may interfere with this assay causing erroneously high patient results.   11/10/2022 0.6 0.2 - 1.0 mg/dL Final     Comment:     Use of this assay is not recommended for patients undergoing treatment with eltrombopag due to the potential for falsely elevated results.     Alkaline Phosphatase   Date Value Ref Range Status   08/14/2023 84 35 - 120 U/L Final   04/24/2023 68 35 - 120 U/L Final   11/10/2022 86 35 - 120 U/L Final     AST   Date Value Ref Range Status   08/14/2023 30 <41 U/L Final   04/24/2023 29 <41 U/L Final   11/10/2022 34 <41 U/L Final     ALT   Date Value Ref Range Status   08/14/2023 51 <56 U/L Final   04/24/2023 55 <56 U/L Final   11/10/2022 67 (H) <56 U/L Final      LDH   Date Value Ref Range Status   11/10/2022 259 (H) 100 - 250 U/L Final   11/13/2020 210 100 - 250 U/L Final     Comment:       Ordering Provider: CARMEN WU  Report Copied to : DOCTOR, OUTPATIENT     LD   Date Value Ref Range Status   08/25/2022 210 81 - 234 U/L Final   07/11/2022 266 (H) 81 - 234 U/L Final     TSH   Date Value Ref Range Status   04/18/2022 1.25 0.36 - 3.74 uIU/mL Final   04/10/2021 1.08 0.36 - 3.74 uIU/mL Final     No results found for: \"S9SKGNO\"   No results found for: \"FREET4\"      RECENT IMAGING:  No results found.          Assessment/Plan  Mr. Oscar is a 79-year-old gentleman with melanoma in situ on the face here for continued monitoring, follow-up and surveillance.    1. Encounter for follow-up surveillance of melanoma  2. Malignant melanoma of face excluding eyelid, nose, lip, and ear (HCC)  He is doing well with no clinical evidence of melanoma recurrence on his left cheek.  No " lymphadenopathy.  He was unable to get blood work prior to today's visit.  He will get blood work within a week or 2 after his visit today.  He will also return in 1 year for follow-up.  Orders placed and prescription divided for blood work to be done now and at return follow-up in 1 year.  He knows to continue to monitor for any new, changing, concerning skin lesions or lymphadenopathy.  He knows to call with issues or concerns prior to his next visit.      - CBC and differential; Standing  - Comprehensive metabolic panel; Standing  - LD,Blood; Standing    3. Lymphoma, small lymphocytic (HCC)  Follows with [facility] hematology for his CLL.  Follow-up as planned.    - CBC and differential; Standing  - Comprehensive metabolic panel; Standing  - LD,Blood; Standing        Return in about 1 year (around 6/27/2025) for Office Visit, labs.     Cuca Duarte MD, PhD

## 2024-07-02 NOTE — PROGRESS NOTES
[facility]  [address] [facility] [address]  [phone]     Date of Visit: 6/27/2024  Name: Sanjiv Oscar   YOB: 1945        Subjective    VISIT DIAGNOSIS:  Diagnoses and all orders for this visit:    Encounter for follow-up surveillance of melanoma    Malignant melanoma of face excluding eyelid, nose, lip, and ear (HCC)  -     CBC and differential; Standing  -     Comprehensive metabolic panel; Standing  -     LD,Blood; Standing    Lymphoma, small lymphocytic (HCC)  -     CBC and differential; Standing  -     Comprehensive metabolic panel; Standing  -     LD,Blood; Standing        Oncology History   Malignant melanoma of face excluding eyelid, nose, lip, and ear (HCC)   8/12/2010 -  Cancer Staged    Staging form: Melanoma of the Skin, AJCC 6th Edition  - Clinical stage from 8/12/2010: Stage 0 (Tis, N0, M0) - Signed by Cuca Duarte MD on 6/23/2021 8/12/2010 Surgery    Moh's surgery with Dr. Kyle Singer at [facility]     11/20/2018 Initial Diagnosis    Malignant melanoma of face excluding eyelid, nose, lip, and ear (HCC)        Cancer Staging   Malignant melanoma of face excluding eyelid, nose, lip, and ear (HCC)  Staging form: Melanoma of the Skin, AJCC 6th Edition  - Clinical stage from 8/12/2010: Stage 0 (Tis, N0, M0) - Signed by Cuca Duarte MD on 6/23/2021          HISTORY OF PRESENT ILLNESS: Sanjiv Oscar is a 79 y.o. male  who melanoma in situ of the cheek and underlying CLL here for continued monitoring, follow-up and surveillance.      He is doing well.  No issues concerns or complaints.  No new, new, concerning skin lesions.  Does follow with dermatology.  No lymphadenopathy.  No concerns with recurrence at the site of the original melanoma on his cheek.  Status post Mohs at [facility] in 2010.    No fevers, chills, increased infections.  No night sweats.  No unintentional weight loss.   Follows with hematology at [facility] for his CLL.        REVIEW OF SYSTEMS:  Review of Systems   Constitutional:  Negative for appetite change, fatigue, fever and unexpected weight change.   HENT:   Negative for lump/mass, trouble swallowing and voice change.    Eyes:  Negative for icterus.   Respiratory:  Negative for cough, shortness of breath and wheezing.    Cardiovascular:  Negative for leg swelling.   Gastrointestinal:  Negative for abdominal pain, constipation, diarrhea, nausea and vomiting.   Genitourinary:  Negative for difficulty urinating and hematuria.    Musculoskeletal:  Negative for arthralgias, gait problem and myalgias.   Skin:  Negative for itching and rash.        No new, changing, or concerning lesions.   Neurological:  Negative for extremity weakness, gait problem, headaches, light-headedness and numbness.   Hematological:  Negative for adenopathy.        MEDICATIONS:    Current Outpatient Medications:     amLODIPine (NORVASC) 5 mg tablet, 5 mg, Disp: , Rfl:     atorvastatin (LIPITOR) 10 mg tablet, Take 10 mg by mouth daily, Disp: , Rfl: 3    Drysol 20 % external solution, APPLY ONCE OR TWICE WEEKLY AT BEDTIME, WASH OFF IN THE MORNING, Disp: , Rfl:     FLUoxetine (PROzac) 40 MG capsule, Prozac, Disp: , Rfl:     irbesartan (AVAPRO) 150 mg tablet, irbesartan 150 mg tablet, Disp: , Rfl:     LORazepam (ATIVAN) 0.5 mg tablet, , Disp: , Rfl:     losartan (COZAAR) 100 MG tablet, Take 100 mg by mouth daily, Disp: , Rfl: 3    methylphenidate (RITALIN) 10 mg tablet, , Disp: , Rfl:     metoprolol succinate (TOPROL-XL) 25 mg 24 hr tablet, Take 25 mg by mouth daily, Disp: , Rfl:     SUMAtriptan (IMITREX) 25 mg tablet, TAKE 1 TABLET BY MOUTH AT ONSET OF MIGRAINE. 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    Malignant melanoma of face excluding eyelid, nose, lip, and ear (HCC)    Essential hypertension    Lymphoma, small lymphocytic (HCC)    Family history of cancer    Benign prostatic hyperplasia with lower urinary tract symptoms    Other male erectile dysfunction          PAST MEDICAL HISTORY:   Past Medical History:   Diagnosis Date    BPH with obstruction/lower urinary tract symptoms 2016    CLL (chronic lymphocytic leukemia) (HCC)     Corporo-venous occlusive erectile dysfunction 2016    Cyst, kidney, acquired 2016    Hearing loss     asymmetric left sided    Hyperlipidemia     Hypertension     Skin cancer     melanoma        PAST SURGICAL HISTORY:  Past Surgical History:   Procedure Laterality Date    BACK SURGERY  2016    EAR SURGERY Left     transtympanic steroid injections x 3 - 2017    SKIN CANCER EXCISION      facial melanoma - [facility]        SOCIAL HISTORY:  Social History     Socioeconomic History    Marital status: /Civil Union     Spouse name: None    Number of children: None    Years of education: None    Highest education level: None   Occupational History    None   Tobacco Use    Smoking status: Former     Current packs/day: 0.00     Average packs/day: 1 pack/day for 19.5 years (19.5 ttl pk-yrs)     Types: Cigarettes     Start date: 1967     Quit date:      Years since quittin.5    Smokeless tobacco: Never   Vaping Use    Vaping status: Never Used   Substance and Sexual Activity    Alcohol use: Yes     Alcohol/week: 14.0 standard drinks of alcohol     Types: 14 Glasses of wine per week    Drug use: Yes     Types: Marijuana     Comment: occasional    Sexual activity: None   Other Topics Concern    None   Social History Narrative    Consumes 3-4 cups of coffee per day     Social Determinants of Health  "    Financial Resource Strain: Not on file   Food Insecurity: Not on file   Transportation Needs: Not on file   Physical Activity: Not on file   Stress: Not on file   Social Connections: Not on file   Intimate Partner Violence: Not on file   Housing Stability: Not on file        FAMILY HISTORY:  Family History   Problem Relation Age of Onset    Pancreatic cancer Father     Skin cancer Sister     Breast cancer Sister     Melanoma Brother            Objective    PHYSICAL EXAMINATION:   Blood pressure 120/70, pulse 58, temperature 98 °F (36.7 °C), resp. rate 18, height 5' 10\" (1.778 m), weight 79.4 kg (175 lb), SpO2 99%.     Pain Score: 0-No pain      Physical Exam  Constitutional:       General: He is not in acute distress.     Appearance: Normal appearance. He is not ill-appearing or toxic-appearing.   HENT:      Head: Normocephalic and atraumatic.      Comments: Stable venous congestion in the right cheek superior to resection.  Has remained stable over the past few years no changes.  No concerning features alarming for melanoma recurrence.     Right Ear: External ear normal.      Left Ear: External ear normal.      Nose: Nose normal.      Mouth/Throat:      Mouth: Mucous membranes are moist.      Pharynx: Oropharynx is clear.   Eyes:      General: No scleral icterus.        Right eye: No discharge.         Left eye: No discharge.      Conjunctiva/sclera: Conjunctivae normal.   Cardiovascular:      Rate and Rhythm: Normal rate and regular rhythm.      Pulses: Normal pulses.      Heart sounds: No murmur heard.     No friction rub. No gallop.   Pulmonary:      Effort: Pulmonary effort is normal. No respiratory distress.      Breath sounds: Normal breath sounds. No wheezing or rales.   Abdominal:      General: Bowel sounds are normal. There is no distension.      Palpations: There is no mass.      Tenderness: There is no abdominal tenderness. There is no rebound.   Musculoskeletal:         General: No swelling or " tenderness.      Cervical back: Normal range of motion. No rigidity.      Right lower leg: No edema.      Left lower leg: No edema.   Lymphadenopathy:      Head:      Right side of head: No submandibular, preauricular or posterior auricular adenopathy.      Left side of head: No submandibular, preauricular or posterior auricular adenopathy.      Cervical: No cervical adenopathy.      Right cervical: No superficial or posterior cervical adenopathy.     Left cervical: No superficial or posterior cervical adenopathy.      Upper Body:      Right upper body: No supraclavicular or axillary adenopathy.      Left upper body: No supraclavicular or axillary adenopathy.   Skin:     General: Skin is warm.      Coloration: Skin is not jaundiced.      Findings: No lesion or rash.      Comments: Well healed surgical scar.  No evidence of recurrence at primary site.   Neurological:      General: No focal deficit present.      Mental Status: He is alert and oriented to person, place, and time.      Cranial Nerves: No cranial nerve deficit.      Motor: No weakness.      Gait: Gait normal.   Psychiatric:         Mood and Affect: Mood normal.         Behavior: Behavior normal.         Thought Content: Thought content normal.         Judgment: Judgment normal.         I reviewed lab data in the chart.    WBC   Date Value Ref Range Status   11/08/2022 23.23 (H) 4.31 - 10.16 Thousand/uL Final     Hemoglobin   Date Value Ref Range Status   11/08/2022 15.8 12.0 - 17.0 g/dL Final     Platelets   Date Value Ref Range Status   11/08/2022 240 149 - 390 Thousands/uL Final     MCV   Date Value Ref Range Status   11/08/2022 94 82 - 98 fL Final      Potassium   Date Value Ref Range Status   08/14/2023 5.3 (H) 3.5 - 5.2 mmol/L Final   04/24/2023 4.6 3.5 - 5.2 mmol/L Final   11/10/2022 4.7 3.5 - 5.2 mmol/L Final     Chloride   Date Value Ref Range Status   08/14/2023 103 100 - 109 mmol/L Final   04/24/2023 104 100 - 109 mmol/L Final   11/10/2022 105  100 - 109 mmol/L Final     Carbon Dioxide   Date Value Ref Range Status   08/14/2023 28 21 - 31 mmol/L Final   04/24/2023 27 21 - 31 mmol/L Final   11/10/2022 30 23 - 31 mmol/L Final     BUN   Date Value Ref Range Status   08/14/2023 15 7 - 28 mg/dL Final   04/24/2023 11 7 - 28 mg/dL Final   11/10/2022 13 7 - 28 mg/dL Final     Creatinine   Date Value Ref Range Status   08/14/2023 0.77 0.53 - 1.30 mg/dL Final   04/24/2023 0.78 0.53 - 1.30 mg/dL Final   11/10/2022 0.91 0.53 - 1.30 mg/dL Final     Glucose   Date Value Ref Range Status   08/14/2023 104 (H) 65 - 99 mg/dL Final   04/24/2023 93 65 - 99 mg/dL Final   11/10/2022 97 65 - 99 mg/dL Final     eGFR, Non-   Date Value Ref Range Status   12/02/2021 98 >60 Final   04/10/2021 90 >60 Final     Comment:     Specimen moderately hemolyzed, results may be affected.   10/29/2019 86 > OR = 60 mL/min/1.73m2 Final     eGFR,    Date Value Ref Range Status   12/02/2021 114 >60 Final   04/10/2021 104 >60 Final     Comment:     Specimen moderately hemolyzed, results may be affected.     Calcium   Date Value Ref Range Status   08/14/2023 9.3 8.5 - 10.1 mg/dL Final   04/24/2023 9.3 8.5 - 10.1 mg/dL Final   11/10/2022 9.5 8.5 - 10.1 mg/dL Final     ALBUMIN   Date Value Ref Range Status   08/14/2023 4.2 3.5 - 5.7 g/dL Final   04/24/2023 4.3 3.5 - 5.7 g/dL Final   11/10/2022 4.2 3.5 - 4.8 g/dL Final     Total Bilirubin   Date Value Ref Range Status   08/14/2023 0.8 0.2 - 1.0 mg/dL Final     Comment:     Eltrombopag and its metabolites may interfere with this assay causing erroneously high patient results.   04/24/2023 1.0 0.2 - 1.0 mg/dL Final     Comment:     Eltrombopag and its metabolites may interfere with this assay causing erroneously high patient results.   11/10/2022 0.6 0.2 - 1.0 mg/dL Final     Comment:     Use of this assay is not recommended for patients undergoing treatment with eltrombopag due to the potential for falsely elevated  "results.     Alkaline Phosphatase   Date Value Ref Range Status   08/14/2023 84 35 - 120 U/L Final   04/24/2023 68 35 - 120 U/L Final   11/10/2022 86 35 - 120 U/L Final     AST   Date Value Ref Range Status   08/14/2023 30 <41 U/L Final   04/24/2023 29 <41 U/L Final   11/10/2022 34 <41 U/L Final     ALT   Date Value Ref Range Status   08/14/2023 51 <56 U/L Final   04/24/2023 55 <56 U/L Final   11/10/2022 67 (H) <56 U/L Final      LDH   Date Value Ref Range Status   11/10/2022 259 (H) 100 - 250 U/L Final   11/13/2020 210 100 - 250 U/L Final     Comment:       Ordering Provider: CARMEN WU  Report Copied to : DOCTOR, OUTPATIENT     LD   Date Value Ref Range Status   08/25/2022 210 81 - 234 U/L Final   07/11/2022 266 (H) 81 - 234 U/L Final     TSH   Date Value Ref Range Status   04/18/2022 1.25 0.36 - 3.74 uIU/mL Final   04/10/2021 1.08 0.36 - 3.74 uIU/mL Final     No results found for: \"M5JXVLR\"   No results found for: \"FREET4\"      RECENT IMAGING:  No results found.          Assessment/Plan  Mr. Oscar is a 79-year-old gentleman with melanoma in situ on the face here for continued monitoring, follow-up and surveillance.    1. Encounter for follow-up surveillance of melanoma  2. Malignant melanoma of face excluding eyelid, nose, lip, and ear (HCC)  He is doing well with no clinical evidence of melanoma recurrence on his left cheek.  No lymphadenopathy.  He was unable to get blood work prior to today's visit.  He will get blood work within a week or 2 after his visit today.  He will also return in 1 year for follow-up.  Orders placed and prescription divided for blood work to be done now and at return follow-up in 1 year.  He knows to continue to monitor for any new, changing, concerning skin lesions or lymphadenopathy.  He knows to call with issues or concerns prior to his next visit.      - CBC and differential; Standing  - Comprehensive metabolic panel; Standing  - LD,Blood; Standing    3. Lymphoma, small " lymphocytic (HCC)  Follows with [facility] hematology for his CLL.  Follow-up as planned.    - CBC and differential; Standing  - Comprehensive metabolic panel; Standing  - LD,Blood; Standing        Return in about 1 year (around 6/27/2025) for Office Visit, labs.     Cuca Duarte MD, PhD

## 2025-05-22 ENCOUNTER — TELEPHONE (OUTPATIENT)
Dept: HEMATOLOGY ONCOLOGY | Facility: CLINIC | Age: 80
End: 2025-05-22

## 2025-05-22 NOTE — TELEPHONE ENCOUNTER
Left message on patient's phone indicating to have labs drawn prior to appointment.  Indicated that the scripts are in the system, the tests are non-fasting and that patient can go to any [facility] facility to have the labs drawn.  Provided callback number [phone].

## 2025-06-05 ENCOUNTER — OFFICE VISIT (OUTPATIENT)
Dept: HEMATOLOGY ONCOLOGY | Facility: CLINIC | Age: 80
End: 2025-06-05
Payer: MEDICARE

## 2025-06-05 VITALS
DIASTOLIC BLOOD PRESSURE: 76 MMHG | OXYGEN SATURATION: 98 % | HEART RATE: 78 BPM | BODY MASS INDEX: 25.05 KG/M2 | TEMPERATURE: 97.8 F | RESPIRATION RATE: 17 BRPM | HEIGHT: 70 IN | WEIGHT: 175 LBS | SYSTOLIC BLOOD PRESSURE: 126 MMHG

## 2025-06-05 DIAGNOSIS — C83.00 LYMPHOMA, SMALL LYMPHOCYTIC (HCC): ICD-10-CM

## 2025-06-05 DIAGNOSIS — C43.30 MALIGNANT MELANOMA OF FACE EXCLUDING EYELID, NOSE, LIP, AND EAR (HCC): ICD-10-CM

## 2025-06-05 DIAGNOSIS — Z85.820 ENCOUNTER FOR FOLLOW-UP SURVEILLANCE OF MELANOMA: Primary | ICD-10-CM

## 2025-06-05 DIAGNOSIS — Z08 ENCOUNTER FOR FOLLOW-UP SURVEILLANCE OF MELANOMA: Primary | ICD-10-CM

## 2025-06-05 PROCEDURE — G2211 COMPLEX E/M VISIT ADD ON: HCPCS | Performed by: INTERNAL MEDICINE

## 2025-06-05 PROCEDURE — 99213 OFFICE O/P EST LOW 20 MIN: CPT | Performed by: INTERNAL MEDICINE

## 2025-06-05 RX ORDER — PREDNISOLONE ACETATE 10 MG/ML
SUSPENSION/ DROPS OPHTHALMIC
COMMUNITY
Start: 2025-03-17

## 2025-06-05 NOTE — LETTER
2025     Fiorella Srinivasan MD  [facility]  [address] [place] [id number]    Patient: Sanjiv Oscar   YOB: 1945   Date of Visit: 2025       Dear MD Vladimir Liz MD:    Thank you for referring Sanjiv Oscar to me for evaluation. Below are my notes for this consultation.    If you have questions, please do not hesitate to call me. I look forward to following your patient along with you.         Sincerely,        Cuca Duarte MD        CC: MD Cuca Acosta MD  2025  9:58 PM  Sign when Signing Visit  Name: Sanjiv Oscar      : 1945      MRN: [id number]  Encounter Provider: Cuca Duarte MD  Encounter Date: 2025   Encounter department: [facility]  :  Assessment & Plan  Encounter for follow-up surveillance of melanoma  Mr. Oscar is a 80-year-old gentleman with melanoma tissue on the face in  treated by Mohs surgery down to [facility] who continues in close monitoring, follow-up and surveillance given his family history with his brother passed away from melanoma.  Clinically he is doing well with no evidence of disease recurrence.  Labs reviewed and okay.  The dark pigmentation areas on the face is possibly due to hemosiderin staining from unresolved blood and bruising following the Mohs vs melanosis. Dermatology follow-up occurs every six months, with no current concerns regarding the skin.   He knows to continue to monitor for any new, changing, concerning skin lesions or lymphadenopathy.  He knows to call with issues or concerns prior to his next visit.  He will 6 months for follow-up time.  Orders placed.        Malignant melanoma of face excluding eyelid, nose, lip, and ear (HCC)  Mr. Oscar is a 80-year-old gentleman with melanoma tissue on the face in  treated by Mohs surgery down to [facility] who continues in close monitoring,  follow-up and surveillance given his family history with his brother passed away from melanoma.  Clinically he is doing well with no evidence of disease recurrence.  Labs reviewed and okay.  The dark pigmentation areas on the face is possibly due to hemosiderin staining from unresolved blood and bruising following the Mohs vs melanomosis. Dermatology follow-up occurs every six months, with no current concerns regarding the skin.   He knows to continue to monitor for any new, changing, concerning skin lesions or lymphadenopathy.  He knows to call with issues or concerns prior to his next visit.  He will 6 months for follow-up time.  Orders placed.   Orders:  •  CBC and differential; Future  •  Comprehensive metabolic panel; Future  •  LD,Blood; Future    Lymphoma, small lymphocytic (HCC)  Doing well and managed at [facility].  No B symptoms completely asymptomatic.  White blood cell count 6.3 at last check.  He will follow-up and plan as per his following physician.  Orders:  •  CBC and differential; Future  •  Comprehensive metabolic panel; Future  •  LD,Blood; Future        Return for Office Visit, labs.    History of Present Illness  Chief Complaint   Patient presents with   • Follow-up     History of Present Illness  The patient is a 80-year-old gentleman with a history of melanoma in situ on his cheek and underlying chronic lymphocytic leukemia (CLL), here for continued monitoring, follow-up, and surveillance.    He had Mohs surgery for the melanoma on his cheek in 2010 at the [facility] with Dr. Singer. He continues to see dermatology every half a year and reports no current concerns regarding his skin. He expresses curiosity about the potential link between his Mohs procedure and the persistent dark spot on his face. Despite laser treatment by a dermatologist, the spot reappeared. He also notes a similar spot in his eye. He mentions that his brother passed away from melanoma  at the age of 50.    His CLL is monitored by hematology oncology at [facility]. He has never required treatment for CLL and remains asymptomatic, reporting no fevers, chills, or night sweats.    He recently underwent knee replacement surgery and is recovering well.    Denies any new, changing, concerning skin lesions or lymphadenopathy.    PAST SURGICAL HISTORY:  Mohs surgery for melanoma on the cheek in   Knee replacement surgery (date not specified)    FAMILY HISTORY  His brother  from melanoma at the age of 50.  Oncology History  Cancer Staging   Malignant melanoma of face excluding eyelid, nose, lip, and ear (HCC)  Staging form: Melanoma of the Skin, AJCC 6th Edition  - Clinical stage from 2010: Stage 0 (Tis, N0, M0) - Signed by Cuca Duarte MD on 2021  Oncology History   Malignant melanoma of face excluding eyelid, nose, lip, and ear (HCC)   2010 -  Cancer Staged    Staging form: Melanoma of the Skin, AJCC 6th Edition  - Clinical stage from 2010: Stage 0 (Tis, N0, M0) - Signed by Cuca Duarte MD on 2021 Surgery    Moh's surgery with Dr. Kyle Singer at [facility]     2018 Initial Diagnosis    Malignant melanoma of face excluding eyelid, nose, lip, and ear (HCC)             Review of Systems   Constitutional:  Negative for activity change, chills, diaphoresis, fatigue, fever and unexpected weight change.   HENT:  Negative for congestion, hearing loss, trouble swallowing and voice change.    Respiratory:  Negative for cough, shortness of breath and wheezing.    Cardiovascular:  Negative for chest pain, palpitations and leg swelling.   Gastrointestinal:  Negative for abdominal distention, abdominal pain, constipation, diarrhea, nausea and vomiting.   Musculoskeletal:  Negative for arthralgias and myalgias.   Skin:  Negative for color change and rash.   Neurological:  Negative for dizziness, seizures, speech difficulty, weakness,  "light-headedness and headaches.   Hematological:  Negative for adenopathy.     Medications Ordered Prior to Encounter[1]       Objective  /76 (BP Location: Left arm, Patient Position: Sitting, Cuff Size: Adult)   Pulse 78   Temp 97.8 °F (36.6 °C) (Temporal)   Resp 17   Ht 5' 10\" (1.778 m)   Wt 79.4 kg (175 lb) Comment: Self reported  SpO2 98%   BMI 25.11 kg/m²     Pain Screening:  Pain Score: 0-No pain  ECOG ECOG Performance Status: 0 - Fully active, able to carry on all pre-disease performance without restriction     Physical Exam  Constitutional:       General: He is not in acute distress.     Appearance: Normal appearance. He is not ill-appearing.   HENT:      Head: Normocephalic and atraumatic.      Right Ear: External ear normal.      Left Ear: External ear normal.      Nose: Nose normal. No congestion.      Mouth/Throat:      Mouth: Mucous membranes are moist.      Pharynx: Oropharynx is clear. No oropharyngeal exudate.     Eyes:      General: No scleral icterus.        Right eye: No discharge.         Left eye: No discharge.      Conjunctiva/sclera: Conjunctivae normal.       Cardiovascular:      Rate and Rhythm: Normal rate and regular rhythm.      Pulses: Normal pulses.      Heart sounds: No murmur heard.     No friction rub. No gallop.   Pulmonary:      Effort: Pulmonary effort is normal. No respiratory distress.      Breath sounds: Normal breath sounds. No wheezing or rales.   Abdominal:      General: Bowel sounds are normal. There is no distension.      Palpations: There is no mass.      Tenderness: There is no abdominal tenderness. There is no rebound.     Musculoskeletal:         General: No swelling or tenderness. Normal range of motion.      Cervical back: Normal range of motion. No rigidity.      Right lower leg: No edema.      Left lower leg: No edema.   Lymphadenopathy:      Head:      Right side of head: No submental, submandibular, preauricular, posterior auricular or occipital " adenopathy.      Left side of head: No submental, submandibular, preauricular, posterior auricular or occipital adenopathy.      Cervical: No cervical adenopathy.      Right cervical: No superficial or posterior cervical adenopathy.     Left cervical: No superficial or posterior cervical adenopathy.      Upper Body:      Right upper body: No supraclavicular or axillary adenopathy.      Left upper body: No supraclavicular or axillary adenopathy.     Skin:     General: Skin is warm.      Coloration: Skin is not jaundiced.      Findings: No lesion or rash.      Comments: Scar well healed.  No evidence of recurrence at primary site.  No concerning skin lesions.  Stable area of pigmentation/melanosis on left cheek     Neurological:      General: No focal deficit present.      Mental Status: He is alert and oriented to person, place, and time.      Cranial Nerves: No cranial nerve deficit.      Motor: No weakness.      Gait: Gait normal.     Psychiatric:         Mood and Affect: Mood normal.         Behavior: Behavior normal.         Thought Content: Thought content normal.         Judgment: Judgment normal.       Physical Exam  ENT: No swollen lymph nodes in the neck.  Respiratory: Clear breath sounds bilaterally.  Integument/Skin: Skin examination revealed no concerning lesions.  Hematologic/Lymphatic: No lymphadenopathy in the neck.    Results  Labs   - Blood work: Within normal limits or not clinically significant at this time. Mildly elevated alkaline phosphatase.   - CBC: Within normal limits. White count is normal at 15.4.  Labs: I have reviewed the following labs:  Lab Results   Component Value Date/Time    Hemoglobin 14.1 04/02/2025 06:02 AM    Hematocrit 40.5 04/02/2025 06:02 AM     Lab Results   Component Value Date/Time    Potassium 4.4 06/02/2025 12:03 PM    Chloride 102 06/02/2025 12:03 PM    Carbon Dioxide 28 06/02/2025 12:03 PM    BUN 11 06/02/2025 12:03 PM    Creatinine 0.64 06/02/2025 12:03 PM    Calcium  "9.3 06/02/2025 12:03 PM    AST 26 06/02/2025 12:03 PM    ALT 31 06/02/2025 12:03 PM    Alkaline Phosphatase 174 (H) 06/02/2025 12:03 PM    Protein, Total 6.6 06/02/2025 12:03 PM    ALBUMIN 4.7 06/02/2025 12:03 PM    Total Bilirubin 0.7 06/02/2025 12:03 PM    eGFRcr 97 06/02/2025 12:03 PM   No results found for: \"[id number]\", \"FREET4\", \"LDH\", \"CEA\", \"\", \"\", \"LY2889\", \"\", \"CHROMGRANINA\", \"NSE\"     Radiology Results Review : No pertinent imaging studies reviewed.        Cuca Duarte MD, PhD       [1]   Current Outpatient Medications on File Prior to Visit   Medication Sig Dispense Refill   • prednisoLONE acetate (PRED FORTE) 1 % ophthalmic suspension INSTILL 1 DROP INTO AFFECTED EYE FOUR TIMES DAILY AS DIRECTED STARTING 3 DAYS PRIOR TO SURGERY     • amLODIPine (NORVASC) 5 mg tablet 5 mg     • atorvastatin (LIPITOR) 10 mg tablet Take 10 mg by mouth daily  3   • Drysol 20 % external solution APPLY ONCE OR TWICE WEEKLY AT BEDTIME, WASH OFF IN THE MORNING     • FLUoxetine (PROzac) 40 MG capsule Prozac     • irbesartan (AVAPRO) 150 mg tablet irbesartan 150 mg tablet     • LORazepam (ATIVAN) 0.5 mg tablet      • losartan (COZAAR) 100 MG tablet Take 100 mg by mouth daily  3   • methylphenidate (RITALIN) 10 mg tablet      • metoprolol succinate (TOPROL-XL) 25 mg 24 hr tablet Take 25 mg by mouth daily     • SUMAtriptan (IMITREX) 25 mg tablet TAKE 1 TABLET BY MOUTH AT ONSET OF MIGRAINE. MAY REPEAT ONE TIME WITHIN 24 HOURS  5   • triamcinolone acetonide (KENALOG-40) 40 mg/mL 1 mL     • zolpidem (AMBIEN) 10 mg tablet Take 5 mg by mouth daily   5     No current facility-administered medications on file prior to visit.     "

## 2025-06-09 NOTE — ASSESSMENT & PLAN NOTE
Mr. Oscar is a 80-year-old gentleman with melanoma tissue on the face in 2010 treated by Mohs surgery down to [facility] who continues in close monitoring, follow-up and surveillance given his family history with his brother passed away from melanoma.  Clinically he is doing well with no evidence of disease recurrence.  Labs reviewed and okay.  The dark pigmentation areas on the face is possibly due to hemosiderin staining from unresolved blood and bruising following the Mohs vs melanomosis. Dermatology follow-up occurs every six months, with no current concerns regarding the skin.   He knows to continue to monitor for any new, changing, concerning skin lesions or lymphadenopathy.  He knows to call with issues or concerns prior to his next visit.  He will 6 months for follow-up time.  Orders placed.   Orders:    CBC and differential; Future    Comprehensive metabolic panel; Future    LD,Blood; Future

## 2025-06-09 NOTE — ASSESSMENT & PLAN NOTE
Doing well and managed at [facility].  No B symptoms completely asymptomatic.  White blood cell count 6.3 at last check.  He will follow-up and plan as per his following physician.  Orders:    CBC and differential; Future    Comprehensive metabolic panel; Future    LD,Blood; Future

## 2025-06-09 NOTE — PROGRESS NOTES
Name: Sanjiv Oscar      : 1945      MRN: [id number]  Encounter Provider: Cuca Duarte MD  Encounter Date: 2025   Encounter department: [facility] H[facility]ATOLOGY [facility]  :  Assessment & Plan  Encounter for follow-up surveillance of melanoma  Mr. Oscar is a 80-year-old gentleman with melanoma tissue on the face in  treated by Mohs surgery down to [facility] who continues in close monitoring, follow-up and surveillance given his family history with his brother passed away from melanoma.  Clinically he is doing well with no evidence of disease recurrence.  Labs reviewed and okay.  The dark pigmentation areas on the face is possibly due to hemosiderin staining from unresolved blood and bruising following the Mohs vs melanosis. Dermatology follow-up occurs every six months, with no current concerns regarding the skin.   He knows to continue to monitor for any new, changing, concerning skin lesions or lymphadenopathy.  He knows to call with issues or concerns prior to his next visit.  He will 6 months for follow-up time.  Orders placed.        Malignant melanoma of face excluding eyelid, nose, lip, and ear (HCC)  Mr. Oscar is a 80-year-old gentleman with melanoma tissue on the face in  treated by Mohs surgery down to [facility] who continues in close monitoring, follow-up and surveillance given his family history with his brother passed away from melanoma.  Clinically he is doing well with no evidence of disease recurrence.  Labs reviewed and okay.  The dark pigmentation areas on the face is possibly due to hemosiderin staining from unresolved blood and bruising following the Mohs vs melanomosis. Dermatology follow-up occurs every six months, with no current concerns regarding the skin.   He knows to continue to monitor for any new, changing, concerning skin lesions or lymphadenopathy.  He knows to call with issues or concerns prior to his next  visit.  He will 6 months for follow-up time.  Orders placed.   Orders:    CBC and differential; Future    Comprehensive metabolic panel; Future    LD,Blood; Future    Lymphoma, small lymphocytic (HCC)  Doing well and managed at [facility].  No B symptoms completely asymptomatic.  White blood cell count 6.3 at last check.  He will follow-up and plan as per his following physician.  Orders:    CBC and differential; Future    Comprehensive metabolic panel; Future    LD,Blood; Future        Return for Office Visit, labs.    History of Present Illness   Chief Complaint   Patient presents with    Follow-up     History of Present Illness  The patient is a 80-year-old gentleman with a history of melanoma in situ on his cheek and underlying chronic lymphocytic leukemia (CLL), here for continued monitoring, follow-up, and surveillance.    He had Mohs surgery for the melanoma on his cheek in  at the [facility] with Dr. Singer. He continues to see dermatology every half a year and reports no current concerns regarding his skin. He expresses curiosity about the potential link between his Mohs procedure and the persistent dark spot on his face. Despite laser treatment by a dermatologist, the spot reappeared. He also notes a similar spot in his eye. He mentions that his brother passed away from melanoma at the age of 50.    His CLL is monitored by hematology oncology at [facility]. He has never required treatment for CLL and remains asymptomatic, reporting no fevers, chills, or night sweats.    He recently underwent knee replacement surgery and is recovering well.    Denies any new, changing, concerning skin lesions or lymphadenopathy.    PAST SURGICAL HISTORY:  Mohs surgery for melanoma on the cheek in   Knee replacement surgery (date not specified)    FAMILY HISTORY  His brother  from melanoma at the age of 50.  Oncology History   Cancer Staging   Malignant melanoma of  "face excluding eyelid, nose, lip, and ear (HCC)  Staging form: Melanoma of the Skin, AJCC 6th Edition  - Clinical stage from 8/12/2010: Stage 0 (Tis, N0, M0) - Signed by Cuca Duarte MD on 6/23/2021  Oncology History   Malignant melanoma of face excluding eyelid, nose, lip, and ear (HCC)   8/12/2010 -  Cancer Staged    Staging form: Melanoma of the Skin, AJCC 6th Edition  - Clinical stage from 8/12/2010: Stage 0 (Tis, N0, M0) - Signed by Cuca Duarte MD on 6/23/2021 8/12/2010 Surgery    Moh's surgery with Dr. Kyle Singer at [facility]     11/20/2018 Initial Diagnosis    Malignant melanoma of face excluding eyelid, nose, lip, and ear (HCC)             Review of Systems   Constitutional:  Negative for activity change, chills, diaphoresis, fatigue, fever and unexpected weight change.   HENT:  Negative for congestion, hearing loss, trouble swallowing and voice change.    Respiratory:  Negative for cough, shortness of breath and wheezing.    Cardiovascular:  Negative for chest pain, palpitations and leg swelling.   Gastrointestinal:  Negative for abdominal distention, abdominal pain, constipation, diarrhea, nausea and vomiting.   Musculoskeletal:  Negative for arthralgias and myalgias.   Skin:  Negative for color change and rash.   Neurological:  Negative for dizziness, seizures, speech difficulty, weakness, light-headedness and headaches.   Hematological:  Negative for adenopathy.     Medications Ordered Prior to Encounter[1]       Objective   /76 (BP Location: Left arm, Patient Position: Sitting, Cuff Size: Adult)   Pulse 78   Temp 97.8 °F (36.6 °C) (Temporal)   Resp 17   Ht 5' 10\" (1.778 m)   Wt 79.4 kg (175 lb) Comment: Self reported  SpO2 98%   BMI 25.11 kg/m²     Pain Screening:  Pain Score: 0-No pain  ECOG ECOG Performance Status: 0 - Fully active, able to carry on all pre-disease performance without restriction     Physical Exam  Constitutional:       General: He is not in acute " distress.     Appearance: Normal appearance. He is not ill-appearing.   HENT:      Head: Normocephalic and atraumatic.      Right Ear: External ear normal.      Left Ear: External ear normal.      Nose: Nose normal. No congestion.      Mouth/Throat:      Mouth: Mucous membranes are moist.      Pharynx: Oropharynx is clear. No oropharyngeal exudate.     Eyes:      General: No scleral icterus.        Right eye: No discharge.         Left eye: No discharge.      Conjunctiva/sclera: Conjunctivae normal.       Cardiovascular:      Rate and Rhythm: Normal rate and regular rhythm.      Pulses: Normal pulses.      Heart sounds: No murmur heard.     No friction rub. No gallop.   Pulmonary:      Effort: Pulmonary effort is normal. No respiratory distress.      Breath sounds: Normal breath sounds. No wheezing or rales.   Abdominal:      General: Bowel sounds are normal. There is no distension.      Palpations: There is no mass.      Tenderness: There is no abdominal tenderness. There is no rebound.     Musculoskeletal:         General: No swelling or tenderness. Normal range of motion.      Cervical back: Normal range of motion. No rigidity.      Right lower leg: No edema.      Left lower leg: No edema.   Lymphadenopathy:      Head:      Right side of head: No submental, submandibular, preauricular, posterior auricular or occipital adenopathy.      Left side of head: No submental, submandibular, preauricular, posterior auricular or occipital adenopathy.      Cervical: No cervical adenopathy.      Right cervical: No superficial or posterior cervical adenopathy.     Left cervical: No superficial or posterior cervical adenopathy.      Upper Body:      Right upper body: No supraclavicular or axillary adenopathy.      Left upper body: No supraclavicular or axillary adenopathy.     Skin:     General: Skin is warm.      Coloration: Skin is not jaundiced.      Findings: No lesion or rash.      Comments: Scar well healed.  No evidence  "of recurrence at primary site.  No concerning skin lesions.  Stable area of pigmentation/melanosis on left cheek     Neurological:      General: No focal deficit present.      Mental Status: He is alert and oriented to person, place, and time.      Cranial Nerves: No cranial nerve deficit.      Motor: No weakness.      Gait: Gait normal.     Psychiatric:         Mood and Affect: Mood normal.         Behavior: Behavior normal.         Thought Content: Thought content normal.         Judgment: Judgment normal.       Physical Exam  ENT: No swollen lymph nodes in the neck.  Respiratory: Clear breath sounds bilaterally.  Integument/Skin: Skin examination revealed no concerning lesions.  Hematologic/Lymphatic: No lymphadenopathy in the neck.    Results  Labs   - Blood work: Within normal limits or not clinically significant at this time. Mildly elevated alkaline phosphatase.   - CBC: Within normal limits. White count is normal at 15.4.  Labs: I have reviewed the following labs:  Lab Results   Component Value Date/Time    Hemoglobin 14.1 04/02/2025 06:02 AM    Hematocrit 40.5 04/02/2025 06:02 AM     Lab Results   Component Value Date/Time    Potassium 4.4 06/02/2025 12:03 PM    Chloride 102 06/02/2025 12:03 PM    Carbon Dioxide 28 06/02/2025 12:03 PM    BUN 11 06/02/2025 12:03 PM    Creatinine 0.64 06/02/2025 12:03 PM    Calcium 9.3 06/02/2025 12:03 PM    AST 26 06/02/2025 12:03 PM    ALT 31 06/02/2025 12:03 PM    Alkaline Phosphatase 174 (H) 06/02/2025 12:03 PM    Protein, Total 6.6 06/02/2025 12:03 PM    ALBUMIN 4.7 06/02/2025 12:03 PM    Total Bilirubin 0.7 06/02/2025 12:03 PM    eGFRcr 97 06/02/2025 12:03 PM   No results found for: \"[id number]\", \"FREET4\", \"LDH\", \"CEA\", \"\", \"\", \"WU2533\", \"\", \"CHROMGRANINA\", \"NSE\"     Radiology Results Review : No pertinent imaging studies reviewed.        Cuca Duarte MD, PhD       [1]   Current Outpatient Medications on File Prior to Visit   Medication Sig Dispense " Refill    prednisoLONE acetate (PRED FORTE) 1 % ophthalmic suspension INSTILL 1 DROP INTO AFFECTED EYE FOUR TIMES DAILY AS DIRECTED STARTING 3 DAYS PRIOR TO SURGERY      amLODIPine (NORVASC) 5 mg tablet 5 mg      atorvastatin (LIPITOR) 10 mg tablet Take 10 mg by mouth daily  3    Drysol 20 % external solution APPLY ONCE OR TWICE WEEKLY AT BEDTIME, WASH OFF IN THE MORNING      FLUoxetine (PROzac) 40 MG capsule Prozac      irbesartan (AVAPRO) 150 mg tablet irbesartan 150 mg tablet      LORazepam (ATIVAN) 0.5 mg tablet       losartan (COZAAR) 100 MG tablet Take 100 mg by mouth daily  3    methylphenidate (RITALIN) 10 mg tablet       metoprolol succinate (TOPROL-XL) 25 mg 24 hr tablet Take 25 mg by mouth daily      SUMAtriptan (IMITREX) 25 mg tablet TAKE 1 TABLET BY MOUTH AT ONSET OF MIGRAINE. MAY REPEAT ONE TIME WITHIN 24 HOURS  5    triamcinolone acetonide (KENALOG-40) 40 mg/mL 1 mL      zolpidem (AMBIEN) 10 mg tablet Take 5 mg by mouth daily   5     No current facility-administered medications on file prior to visit.